# Patient Record
Sex: FEMALE | Race: WHITE | NOT HISPANIC OR LATINO | Employment: FULL TIME | URBAN - METROPOLITAN AREA
[De-identification: names, ages, dates, MRNs, and addresses within clinical notes are randomized per-mention and may not be internally consistent; named-entity substitution may affect disease eponyms.]

---

## 2017-09-20 ENCOUNTER — AMBULATORY - HEALTHEAST (OUTPATIENT)
Dept: NURSING | Facility: CLINIC | Age: 37
End: 2017-09-20

## 2017-09-20 DIAGNOSIS — Z23 FLU VACCINE NEED: ICD-10-CM

## 2017-10-16 ENCOUNTER — OFFICE VISIT - HEALTHEAST (OUTPATIENT)
Dept: FAMILY MEDICINE | Facility: CLINIC | Age: 37
End: 2017-10-16

## 2017-10-16 DIAGNOSIS — Z20.818 EXPOSURE TO STREP THROAT: ICD-10-CM

## 2017-10-16 ASSESSMENT — MIFFLIN-ST. JEOR: SCORE: 1548.79

## 2018-08-01 ENCOUNTER — OFFICE VISIT - HEALTHEAST (OUTPATIENT)
Dept: FAMILY MEDICINE | Facility: CLINIC | Age: 38
End: 2018-08-01

## 2018-08-01 DIAGNOSIS — Z82.2 FAMILY HISTORY OF HEARING LOSS: ICD-10-CM

## 2018-08-01 DIAGNOSIS — E55.9 VITAMIN D DEFICIENCY: ICD-10-CM

## 2018-08-01 DIAGNOSIS — Z00.00 ROUTINE GENERAL MEDICAL EXAMINATION AT A HEALTH CARE FACILITY: ICD-10-CM

## 2018-08-01 DIAGNOSIS — H91.90 DECREASED HEARING: ICD-10-CM

## 2018-08-01 LAB
CHOLEST SERPL-MCNC: 149 MG/DL
FASTING STATUS PATIENT QL REPORTED: YES
FASTING STATUS PATIENT QL REPORTED: YES
GLUCOSE BLD-MCNC: 78 MG/DL (ref 70–125)
HDLC SERPL-MCNC: 48 MG/DL
LDLC SERPL CALC-MCNC: 77 MG/DL
TRIGL SERPL-MCNC: 122 MG/DL

## 2018-08-01 ASSESSMENT — MIFFLIN-ST. JEOR: SCORE: 1580.54

## 2018-08-02 ENCOUNTER — COMMUNICATION - HEALTHEAST (OUTPATIENT)
Dept: FAMILY MEDICINE | Facility: CLINIC | Age: 38
End: 2018-08-02

## 2018-08-02 LAB — 25(OH)D3 SERPL-MCNC: 43.6 NG/ML (ref 30–80)

## 2018-09-25 ENCOUNTER — HOSPITAL ENCOUNTER (OUTPATIENT)
Dept: MAMMOGRAPHY | Facility: CLINIC | Age: 38
Discharge: HOME | End: 2018-09-25
Attending: FAMILY MEDICINE

## 2018-09-25 ENCOUNTER — COMMUNICATION - HEALTHEAST (OUTPATIENT)
Dept: FAMILY MEDICINE | Facility: CLINIC | Age: 38
End: 2018-09-25

## 2018-09-25 DIAGNOSIS — Z12.31 VISIT FOR SCREENING MAMMOGRAM: ICD-10-CM

## 2018-11-05 ENCOUNTER — COMMUNICATION - HEALTHEAST (OUTPATIENT)
Dept: FAMILY MEDICINE | Facility: CLINIC | Age: 38
End: 2018-11-05

## 2019-08-05 ENCOUNTER — OFFICE VISIT - HEALTHEAST (OUTPATIENT)
Dept: FAMILY MEDICINE | Facility: CLINIC | Age: 39
End: 2019-08-05

## 2019-08-05 ENCOUNTER — COMMUNICATION - HEALTHEAST (OUTPATIENT)
Dept: SCHEDULING | Facility: CLINIC | Age: 39
End: 2019-08-05

## 2019-08-05 ENCOUNTER — COMMUNICATION - HEALTHEAST (OUTPATIENT)
Dept: FAMILY MEDICINE | Facility: CLINIC | Age: 39
End: 2019-08-05

## 2019-08-05 DIAGNOSIS — S90.562A INSECT BITE OF LEFT ANKLE, INITIAL ENCOUNTER: ICD-10-CM
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
## 2019-10-09 ENCOUNTER — OFFICE VISIT - HEALTHEAST (OUTPATIENT)
Dept: FAMILY MEDICINE | Facility: CLINIC | Age: 39
End: 2019-10-09

## 2019-10-09 DIAGNOSIS — H91.93 DECREASED HEARING OF BOTH EARS: ICD-10-CM

## 2019-10-09 DIAGNOSIS — Z00.00 ROUTINE GENERAL MEDICAL EXAMINATION AT A HEALTH CARE FACILITY: ICD-10-CM

## 2019-10-09 DIAGNOSIS — E55.9 VITAMIN D DEFICIENCY: ICD-10-CM

## 2019-10-09 LAB
CHOLEST SERPL-MCNC: 165 MG/DL
FASTING STATUS PATIENT QL REPORTED: YES
FASTING STATUS PATIENT QL REPORTED: YES
GLUCOSE BLD-MCNC: 76 MG/DL (ref 70–125)
HDLC SERPL-MCNC: 56 MG/DL
LDLC SERPL CALC-MCNC: 92 MG/DL
TRIGL SERPL-MCNC: 87 MG/DL

## 2019-10-09 ASSESSMENT — MIFFLIN-ST. JEOR: SCORE: 1573.05

## 2019-10-10 LAB
25(OH)D3 SERPL-MCNC: 37.2 NG/ML (ref 30–80)
HPV SOURCE: NORMAL
HUMAN PAPILLOMA VIRUS 16 DNA: NEGATIVE
HUMAN PAPILLOMA VIRUS 18 DNA: NEGATIVE
HUMAN PAPILLOMA VIRUS FINAL DIAGNOSIS: NORMAL
HUMAN PAPILLOMA VIRUS OTHER HR: NEGATIVE
SPECIMEN DESCRIPTION: NORMAL

## 2019-10-16 ENCOUNTER — COMMUNICATION - HEALTHEAST (OUTPATIENT)
Dept: FAMILY MEDICINE | Facility: CLINIC | Age: 39
End: 2019-10-16

## 2019-10-28 ENCOUNTER — COMMUNICATION - HEALTHEAST (OUTPATIENT)
Dept: ADMINISTRATIVE | Facility: CLINIC | Age: 39
End: 2019-10-28

## 2019-10-30 ENCOUNTER — OFFICE VISIT - HEALTHEAST (OUTPATIENT)
Dept: AUDIOLOGY | Facility: CLINIC | Age: 39
End: 2019-10-30

## 2019-10-30 DIAGNOSIS — H69.92 DYSFUNCTION OF LEFT EUSTACHIAN TUBE: ICD-10-CM

## 2020-01-06 ENCOUNTER — HOSPITAL ENCOUNTER (OUTPATIENT)
Dept: MAMMOGRAPHY | Facility: CLINIC | Age: 40
Discharge: HOME | End: 2020-01-06
Attending: FAMILY MEDICINE

## 2020-01-06 DIAGNOSIS — Z00.00 ROUTINE GENERAL MEDICAL EXAMINATION AT A HEALTH CARE FACILITY: ICD-10-CM

## 2021-01-15 ENCOUNTER — OFFICE VISIT - HEALTHEAST (OUTPATIENT)
Dept: FAMILY MEDICINE | Facility: CLINIC | Age: 41
End: 2021-01-15

## 2021-01-15 DIAGNOSIS — Z00.00 ROUTINE GENERAL MEDICAL EXAMINATION AT A HEALTH CARE FACILITY: ICD-10-CM

## 2021-01-15 DIAGNOSIS — Z13.9 SCREENING FOR CONDITION: ICD-10-CM

## 2021-01-15 DIAGNOSIS — E55.9 VITAMIN D DEFICIENCY: ICD-10-CM

## 2021-01-15 LAB
CHOLEST SERPL-MCNC: 154 MG/DL
FASTING STATUS PATIENT QL REPORTED: YES
FASTING STATUS PATIENT QL REPORTED: YES
GLUCOSE BLD-MCNC: 70 MG/DL (ref 70–125)
HDLC SERPL-MCNC: 55 MG/DL
LDLC SERPL CALC-MCNC: 84 MG/DL
TRIGL SERPL-MCNC: 76 MG/DL

## 2021-01-15 ASSESSMENT — MIFFLIN-ST. JEOR: SCORE: 1523.61

## 2021-01-16 ENCOUNTER — COMMUNICATION - HEALTHEAST (OUTPATIENT)
Dept: FAMILY MEDICINE | Facility: CLINIC | Age: 41
End: 2021-01-16

## 2021-01-18 ENCOUNTER — COMMUNICATION - HEALTHEAST (OUTPATIENT)
Dept: FAMILY MEDICINE | Facility: CLINIC | Age: 41
End: 2021-01-18

## 2021-01-18 LAB — 25(OH)D3 SERPL-MCNC: 33.4 NG/ML (ref 30–80)

## 2021-05-31 ENCOUNTER — RECORDS - HEALTHEAST (OUTPATIENT)
Dept: ADMINISTRATIVE | Facility: CLINIC | Age: 41
End: 2021-05-31

## 2021-05-31 VITALS — WEIGHT: 176.5 LBS | HEIGHT: 71 IN | BODY MASS INDEX: 24.71 KG/M2

## 2021-05-31 NOTE — TELEPHONE ENCOUNTER
Triage call:   Bug bite on back of left leg- sore and itchy- since Saturday. Area is warm- more tender yesterday but still achy today. About the size of a nickel. Unsure what bit her.     Triaged to be seen in the clinic- reviewed additional care advice with patient and she verbalizes understanding. Patient warm transferred to scheduling for appointment. No appointments left in the clinic. Reviewed [facility] hours with her and she is agreeable to going in to be seen.     Ramya Moser RN BS [facility] Triage/Med Refill 8/5/2019 3:02 PM    Reason for Disposition    Red or very tender (to touch) area, and started over 24 hours after the bite    Protocols used: INSECT BITE-A-[place]

## 2021-05-31 NOTE — PROGRESS NOTES
Chief Complaint   Patient presents with     Insect Bite     SPIDER BITE 8/3/19, ITCHES, SORE, ABOVE LEFT ANKLE       HPI:  Jennie Alcantara is a 38 y.o. female who presents today complaining of spider bite that occurred 2 days ago. The bite is now sore and itchy.  The color of the bite has turned to a darker red.  She denies any fevers or chills or pus from the bite area.  She is not sure what bit her, and she first noticed it after being outside in the afternoon.    History obtained from the patient.    Problem List:  2015-07: Varicose veins  2015-07: Venous insufficiency  2014-12: Healthcare maintenance  Acute Viral Pharyngitis  Vitamin D Deficiency  Ankle Sprain  Normal vaginal delivery      Past Medical History:   Diagnosis Date     Healthcare maintenance 12/17/2014     Normal vaginal delivery      Normal vaginal delivery      Normal vaginal delivery      Varicose vein 12/17/2014     Varicose Veins     Created by Conversion      Vitamin D Deficiency     Created by Conversion        Social History     Tobacco Use     Smoking status: Never Smoker     Smokeless tobacco: Never Used   Substance Use Topics     Alcohol use: Yes     Comment: occasional       Review of Systems   Constitutional: Negative for chills and fever.   Skin: Positive for color change. Negative for rash.   All other systems reviewed and are negative.      Vitals:    08/05/19 1807   BP: 130/78   Pulse: 75   Resp: 16   Temp: 97.8  F (36.6  C)   TempSrc: Oral   SpO2: 99%   Weight: 185 lb (83.9 kg)       Physical Exam   Constitutional: She appears well-developed and well-nourished. No distress.   HENT:   Head: Normocephalic and atraumatic.   Right Ear: External ear normal.   Left Ear: External ear normal.   Eyes: Conjunctivae are normal. Right eye exhibits no discharge. Left eye exhibits no discharge.   Pulmonary/Chest: Effort normal. No respiratory distress.   Skin: She is not diaphoretic.   Insect bite noted on the left posterior ankle.  There is an area  of induration approximately 1.2 cm in diameter.  It is slightly tender to palpation.  There is no temperature change from the unaffected skin to the affected skin.  There is no fluctuance over the indurated area.   Psychiatric: She has a normal mood and affect. Her behavior is normal. Judgment and thought content normal.       Clinical Decision Making:  Insect bite of unknown etiology.  There is no scaling or necrosis indicative of venomous insect.  No abscess formation or warmth on the erythematous skin indicative of cellulitis.  Patient is reassured by this.  Recommend supportive cares including baking soda paste, Neosporin, and Benadryl cream.  At the end of the encounter, I discussed results, diagnosis, medications. Discussed red flags for immediate return to clinic/ER, as well as indications for follow up if no improvement. Patient understood and agreed to plan. Patient was stable for discharge.    1. Insect bite of left ankle, initial encounter           Patient Instructions   1.  Continue to apply bacitracin or Neosporin for infection prevention.  2.  For itch relief you can try baking soda paste or Benadryl cream.  3.  Continue to monitor symptoms, if you develop any pus, fever,  streaking redness, or warmth to the touch over the bite area I recommend following up.

## 2021-06-01 ENCOUNTER — RECORDS - HEALTHEAST (OUTPATIENT)
Dept: ADMINISTRATIVE | Facility: CLINIC | Age: 41
End: 2021-06-01

## 2021-06-01 VITALS — HEIGHT: 71 IN | WEIGHT: 183.5 LBS | BODY MASS INDEX: 25.69 KG/M2

## 2021-06-02 NOTE — PROGRESS NOTES
ASSESSMENT:  1. Routine general medical examination at a health care facility     - Gynecologic Cytology (PAP Smear)  - Lipid Cascade  - Glucose  - Vitamin D, Total (25-Hydroxy)  - Mammo Screening Bilateral; Future    2. Vitamin D deficiency       3. Decreased hearing of both ears     - Ambulatory referral to Audiology        PLAN:  There are no Patient Instructions on file for this visit.    Orders Placed This Encounter   Procedures     Mammo Screening Bilateral     Standing Status:   Future     Standing Expiration Date:   1/9/2021     Order Specific Question:   Is tomosynthesis (3D) requested?     Answer:   Yes     Order Specific Question:   Patient's previous breast density:     Answer:   Scattered fibroglandular density [2]     Order Specific Question:   Is the patient pregnant?     Answer:   No     Order Specific Question:   Can the procedure be changed per Radiologist protocol?     Answer:   Yes     Influenza,Seasonal,Quad,INJ =/>6months     Lipid Cascade     Order Specific Question:   Fasting is required?     Answer:   Yes     Glucose     Vitamin D, Total (25-Hydroxy)     Ambulatory referral to Audiology     Referral Priority:   Routine     Referral Type:   Audiology     Referral Reason:   Evaluation and Treatment     Requested Specialty:   Audiology     Number of Visits Requested:   1     Medications Discontinued During This Encounter   Medication Reason     levonorgestrel-ethinyl estradiol (ENPRESSE) 50-30 (6)/75-40 (5)/125-30(10) per tablet        No follow-ups on file.    Health Maintenance Due   Topic Date Due     HIV SCREENING  08/30/1993       CHIEF COMPLAINT:  Chief Complaint   Patient presents with     Annual Exam     needs referral for audiology, wasgiven previous referral but did not follow through and would like to now.     Gynecologic Exam       HISTORY OF PRESENT ILLNESS:  Jennie Alcantara is a 39 y.o. female presenting to the clinic today for a physical exam.     She thinks that she is having some  decreased hearing in both ears in multiple environments.  We had placed a referral for an audiology evaluation last year and she never followed through with it so she is requesting another referral be placed.    Her review of systems is negative for chest pains, breathing problems, bowel or bladder issues.  She sees the dentist regularly.    She had her first screening mammogram last year so we discussed doing another one to see if there is much change so that we can make an informed decision as to how often we think we want to try to do screening mammography.      Healthy Habits:   Patient reports regular exercise, dental and eye exams. Uses healthy diet. Always uses seatbelts. Reports uses medications as directed.  Alcohol: None  Smoking: None  Caffeine use: 1 cup a day  Drug use: None  Birth control:  had vasectomy    REVIEW OF SYSTEMS:   All other systems are negative.    Immunization History   Administered Date(s) Administered     Hep A, Adult IM (19yr & older) 12/15/2008     Hep A, historic 12/15/2008, 11/15/2010     Influenza [id number], 2009     Influenza, Seasonal, Inj PF IIV3 2009, 2012, 10/15/2012, 2013, 10/02/2015, 2016     Influenza, inj, historic,unspecified 10/01/2013, 2015, 2016     Influenza, seasonal,quad inj 6-35 mos 10/16/2014     Influenza,seasonal, Inj IIV3 2010, 10/16/2014     Influenza,seasonal,quad inj =/> 6months 2017, 10/09/2019     Td, Adult, Absorbed 2003     Tdap 12/15/2008, 2018       GYNECOLOGIC HISTORY:  Last menstrual period: 10/4/19  Contraception:  had vasectomy  Last Pap: 16 Results were: normal  Last mammogram: 18  Results were: abnormal    OB History        2    Para   2    Term   2            AB        Living   2       SAB        TAB        Ectopic        Multiple        Live Births               Obstetric Comments   Boy and girl             PFSH:     Social History     Tobacco  Use   Smoking Status Never Smoker   Smokeless Tobacco Never Used     Family History   Problem Relation Age of Onset     Cancer Mother      Diabetes Mother      Hearing loss Father      Vision loss Father      No Medical Problems Sister      No Medical Problems Brother      No Medical Problems Daughter      No Medical Problems Son      No Medical Problems Maternal Aunt      No Medical Problems Maternal Uncle      No Medical Problems Paternal Aunt      No Medical Problems Paternal Uncle      Cancer Maternal Grandmother      Stroke Maternal Grandfather      Hyperlipidemia Maternal Grandfather      Cancer Paternal Grandmother      Dementia Paternal Grandfather      No Medical Problems Maternal Uncle      No Medical Problems Paternal Uncle      Hypertension Paternal Uncle      Alcohol abuse Neg Hx      Arthritis Neg Hx      Asthma Neg Hx      Breast cancer Neg Hx      Clotting disorder Neg Hx      Colon cancer Neg Hx      COPD Neg Hx      Depression Neg Hx      Drug abuse Neg Hx      Early death Neg Hx      Heart disease Neg Hx      Kidney disease Neg Hx      Mental illness Neg Hx      Prostate cancer Neg Hx      Social History     Socioeconomic History     Marital status:      Spouse name: Dilip     Number of children: 2     Years of education: None     Highest education level: None   Occupational History     Occupation: cytopathologist     Employer: [employer]   Social Needs     Financial resource strain: None     Food insecurity:     Worry: None     Inability: None     Transportation needs:     Medical: None     Non-medical: None   Tobacco Use     Smoking status: Never Smoker     Smokeless tobacco: Never Used   Substance and Sexual Activity     Alcohol use: Yes     Comment: occasional     Drug use: No     Sexual activity: Yes     Partners: Male     Birth control/protection: Surgical   Lifestyle     Physical activity:     Days per week: None     Minutes per session: None     Stress: None   Relationships      "Social connections:     Talks on phone: None     Gets together: None     Attends Islam service: None     Active member of club or organization: None     Attends meetings of clubs or organizations: None     Relationship status: None     Intimate partner violence:     Fear of current or ex partner: None     Emotionally abused: None     Physically abused: None     Forced sexual activity: None   Other Topics Concern     None   Social History Narrative     with 2 kids     Past Surgical History:   Procedure Laterality Date     WISDOM TOOTH EXTRACTION  2000     No Known Allergies  Active Ambulatory Problems     Diagnosis Date Noted     Vitamin D Deficiency      Healthcare maintenance 12/17/2014     Varicose veins 07/22/2015     Venous insufficiency 07/22/2015     Resolved Ambulatory Problems     Diagnosis Date Noted     Acute Viral Pharyngitis      Ankle Sprain      Normal vaginal delivery      Past Medical History:   Diagnosis Date     Varicose vein 12/17/2014     Varicose Veins      Vitamin D Deficiency        VITALS:  Vitals:    10/09/19 0912   BP: 106/69   Patient Site: Right Arm   Patient Position: Sitting   Cuff Size: Adult Regular   Pulse: 73   SpO2: 97%   Weight: 183 lb 9.6 oz (83.3 kg)   Height: 5' 10\" (1.778 m)     BP Readings from Last 3 Encounters:   10/09/19 106/69   08/05/19 130/78   08/01/18 112/62     Wt Readings from Last 3 Encounters:   10/09/19 183 lb 9.6 oz (83.3 kg)   08/05/19 185 lb (83.9 kg)   08/01/18 183 lb 8 oz (83.2 kg)     Body mass index is 26.34 kg/m .    PHYSICAL EXAM:  General Appearance: Alert, cooperative, no distress, appears stated age  Head: Normocephalic, without obvious abnormality, atraumatic  Eyes: PERRL, conjunctiva/corneas clear, EOM's intact  Ears: Normal TM's and external ear canals, both ears  Nose: Nares normal, septum midline,mucosa normal, no drainage  Throat: Lips, mucosa, and tongue normal; teeth and gums normal  Neck: Supple, symmetrical, trachea midline, no " adenopathy;  thyroid: not enlarged, symmetric, no tenderness/mass/nodules   Back: Symmetric, no curvature, ROM normal, no CVA tenderness  Lungs: Clear to auscultation bilaterally, respirations unlabored  Breasts: No breast masses, tenderness, asymmetry, or nipple discharge.  Fibroglandular changes are noted heart: Regular rate and rhythm, S1 and S2 normal, no murmur, rub, or gallop, Abdomen: Soft, non-tender, bowel sounds active all four quadrants,  no masses, no organomegaly  Pelvic:Normally developed genitalia with no external lesions or eruptions. Vagina and cervix show no lesions, inflammation, discharge or tenderness. No cystocele, No rectocele. Uterus retroflexed.  No adnexal mass or tenderness.    Extremities: Extremities normal, atraumatic, no cyanosis or edema  Skin: Skin color, texture, turgor normal, no rashes or lesions  Lymph nodes: Cervical, supraclavicular, and axillary nodes normal  Neurologic: Normal      MEDICATIONS:  Current Outpatient Medications   Medication Sig Dispense Refill     multivitamin therapeutic (THERAGRAN) tablet Take 1 tablet by mouth daily.       OMEGA-3/DHA/EPA/FISH OIL (FISH OIL-OMEGA-3 FATTY ACIDS) 300-1,000 mg capsule Take 2 g by mouth daily.       cholecalciferol, vitamin D3, 2,000 unit Tab Take 1 tablet by mouth daily.       No current facility-administered medications for this visit.

## 2021-06-03 VITALS
HEIGHT: 70 IN | WEIGHT: 183.6 LBS | BODY MASS INDEX: 26.28 KG/M2 | DIASTOLIC BLOOD PRESSURE: 69 MMHG | SYSTOLIC BLOOD PRESSURE: 106 MMHG | HEART RATE: 73 BPM | OXYGEN SATURATION: 97 %

## 2021-06-03 VITALS — BODY MASS INDEX: 26.17 KG/M2 | WEIGHT: 185 LBS

## 2021-06-05 VITALS
SYSTOLIC BLOOD PRESSURE: 122 MMHG | DIASTOLIC BLOOD PRESSURE: 64 MMHG | WEIGHT: 172.7 LBS | HEART RATE: 62 BPM | HEIGHT: 70 IN | BODY MASS INDEX: 24.73 KG/M2

## 2021-06-13 NOTE — PROGRESS NOTES
Assessment:   1. Exposure to strep throat  Positive for strep.  Initiate treatment with antibiotic.  - Rapid Strep A Screen-Throat  - alum/mag hydrox-simethicone-diphenhydramine-lidocaine (MAGIC MOUTHWASH) suspension; Swish and spit 15 mL 4 (four) times a day.  Dispense: 240 mL; Refill: 0  - amoxicillin-clavulanate (AUGMENTIN) 875-125 mg per tablet; Take 1 tablet by mouth 2 (two) times a day for 10 days. Take 12 hours apart with food.  Dispense: 20 tablet; Refill: 0     Plan:   Patient placed on antibiotics.  Use of OTC analgesics recommended as well as salt water gargles.  Use of decongestant recommended.  Patient advised of the risk of peritonsillar abscess formation.  Patient advised that he will be infectious for 24 hours after starting antibiotics.  Follow up as needed.     Subjective:   Jennie Alcantara is a 37 y.o. female who presents for evaluation of sore throat. Associated symptoms include chills, headache, hoarseness, nasal blockage, green nasal discharge, post nasal drip, sinus and nasal congestion and sore throat. Onset of symptoms was 7 days ago, and have been gradually worsening since that time. She is not drinking much. She has had a recent close exposure to someone with proven streptococcal pharyngitis.    The following portions of the patient's history were reviewed and updated as appropriate:   She  has a past medical history of Healthcare maintenance (12/17/2014); Normal vaginal delivery; Normal vaginal delivery; Normal vaginal delivery; Varicose vein (12/17/2014); Varicose Veins; and Vitamin D Deficiency.  She  does not have any pertinent problems on file.  She  has a past surgical history that includes [place] tooth extraction (2000).  Her family history includes Cancer in her maternal grandmother, mother, and paternal grandmother; Dementia in her paternal grandfather; Diabetes in her mother; Hearing loss in her father; Hyperlipidemia in her maternal grandfather; Hypertension in her paternal uncle; No  "Medical Problems in her brother, daughter, maternal aunt, maternal uncle, maternal uncle, paternal aunt, paternal uncle, paternal uncle, sister, and son; Stroke in her maternal grandfather; Vision loss in her father. There is no history of Alcohol abuse, Arthritis, Asthma, Breast cancer, Clotting disorder, Colon cancer, COPD, Depression, Drug abuse, Early death, Heart disease, Kidney disease, Mental illness, or Prostate cancer.  She  reports that she has never smoked. She does not have any smokeless tobacco history on file. She reports that she drinks alcohol. She reports that she does not use illicit drugs.  Current Outpatient Prescriptions   Medication Sig Dispense Refill     cholecalciferol, vitamin D3, 2,000 unit Tab Take 1 tablet by mouth daily.       multivitamin therapeutic (THERAGRAN) tablet Take 1 tablet by mouth daily.       OMEGA-3/DHA/EPA/FISH OIL (FISH OIL-OMEGA-3 FATTY ACIDS) 300-1,000 mg capsule Take 2 g by mouth daily.       No current facility-administered medications for this visit.      Current Outpatient Prescriptions on File Prior to Visit   Medication Sig     cholecalciferol, vitamin D3, 2,000 unit Tab Take 1 tablet by mouth daily.     multivitamin therapeutic (THERAGRAN) tablet Take 1 tablet by mouth daily.     OMEGA-3/DHA/EPA/FISH OIL (FISH OIL-OMEGA-3 FATTY ACIDS) 300-1,000 mg capsule Take 2 g by mouth daily.     No current facility-administered medications on file prior to visit.      She has No Known Allergies..    Review of Systems  A 12 point comprehensive review of systems was negative except as noted.      Objective:      /58  Pulse 76  Temp 98.3  F (36.8  C) (Oral)   Ht 5' 10.5\" (1.791 m)  Wt 176 lb 8 oz (80.1 kg)  LMP 10/10/2017  SpO2 98%  BMI 24.97 kg/m2  General appearance: alert, appears stated age and cooperative  Head: Normocephalic, without obvious abnormality, atraumatic  Eyes: conjunctivae/corneas clear. PERRL, EOM's intact. Fundi benign.  Ears: normal TM's and " external ear canals both ears  Nose: Nares normal. Septum midline. Mucosa normal. No drainage or sinus tenderness.  Throat: abnormal findings: moderate oropharyngeal erythema  Neck: no adenopathy, no carotid bruit, no JVD, supple, symmetrical, trachea midline and thyroid not enlarged, symmetric, no tenderness/mass/nodules  Lungs: clear to auscultation bilaterally  Heart: regular rate and rhythm, S1, S2 normal, no murmur, click, rub or gallop  Abdomen: soft, non-tender; bowel sounds normal; no masses,  no organomegaly  Pulses: 2+ and symmetric  Skin: Skin color, texture, turgor normal. No rashes or lesions  Lymph nodes: Cervical, supraclavicular, and axillary nodes normal.  Neurologic: Grossly normal    Laboratory  Strep test done. Results:positive.

## 2021-06-14 NOTE — PROGRESS NOTES
ASSESSMENT:  1. Routine general medical examination at a health care facility       2. Screening for condition     - Mammo Screening Bilateral; Future  - Lipid Cascade  - Glucose  - Vitamin D, Total (25-Hydroxy)    3. Vitamin D deficiency              PLAN:  There are no Patient Instructions on file for this visit.    Orders Placed This Encounter   Procedures     Mammo Screening Bilateral     Standing Status:   Future     Standing Expiration Date:   4/15/2022     Order Specific Question:   Is tomosynthesis (3D) requested?     Answer:   Yes     Order Specific Question:   Patient's previous breast density:     Answer:   Scattered fibroglandular density [2]     Order Specific Question:   Is the patient pregnant?     Answer:   No     Order Specific Question:   Can the procedure be changed per Radiologist protocol?     Answer:   Yes     Lipid Cascade     Order Specific Question:   Fasting is required?     Answer:   Yes     Glucose     Vitamin D, Total (25-Hydroxy)     Medications Discontinued During This Encounter   Medication Reason     OMEGA-3/DHA/EPA/FISH OIL (FISH OIL-OMEGA-3 FATTY ACIDS) 300-1,000 mg capsule Therapy completed     cholecalciferol, vitamin D3, 2,000 unit Tab Therapy completed       No follow-ups on file.    Health Maintenance Due   Topic Date Due     HEPATITIS C SCREENING  1978     HIV SCREENING  08/30/1993       CHIEF COMPLAINT:  Chief Complaint   Patient presents with     Annual Exam       HISTORY OF PRESENT ILLNESS:  Jennie Alcantara is a 40 y.o. female presenting to the clinic today for a physical exam.     She has no particular concerns today.  She is fasting so we can do fasting lipids, glucose and a vitamin D.    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  She was furloughed for a few months this spring with the pandemic but now she is back to work full-time but has very flexible hours.    Healthy Habits  Are you taking a daily aspirin? No  Do you typically exercising at  least 40 min, 3-4 times per week?  Yes  Do you usually eat at least 4 servings of fruit and vegetables a day, include whole grains and fiber and avoid regularly eating high fat foods? Yes  Have you had an eye exam in the past two years? Yes  Do you see a dentist twice per year? Yes  Do you have any concerns regarding sleep? No  Do you drink caffeine? YES    Safety Screen  If you own firearms, are they secured in a locked gun cabinet or with trigger locks?    REVIEW OF SYSTEMS:   All other systems are negative.    Immunization History   Administered Date(s) Administered     Hep A, Adult IM (19yr & older) 12/15/2008     Hep A, historic 12/15/2008, 11/15/2010     Influenza Z3o1-89, 2009     Influenza, Seasonal, Inj PF IIV3 2009, 2012, 10/15/2012, 2013, 10/02/2015, 2016     Influenza, inj, historic,unspecified 10/01/2013, 2015, 2016     Influenza, seasonal,quad inj 6-35 mos 10/16/2014     Influenza,seasonal, Inj IIV3 2010, 10/16/2014     Influenza,seasonal,quad inj =/> 6months 2017, 10/09/2019     Td, Adult, Absorbed 2003     Tdap 12/15/2008, 2018       GYNECOLOGIC HISTORY:  Last menstrual period: 2021  Contraception:  has a vasectomy  Last Pap: 10/19 Results were: normal  Last mammogram:   Results were: normal    OB History        2    Para   2    Term   2            AB        Living   2       SAB        TAB        Ectopic        Multiple        Live Births               Obstetric Comments   Boy and girl             PFSH:     Social History     Tobacco Use   Smoking Status Never Smoker   Smokeless Tobacco Never Used     Family History   Problem Relation Age of Onset     Cancer Mother      Diabetes Mother      Hearing loss Father      Vision loss Father      No Medical Problems Sister      No Medical Problems Brother      No Medical Problems Daughter      No Medical Problems Son      No Medical Problems Maternal Aunt      No  Medical Problems Maternal Uncle      No Medical Problems Paternal Aunt      No Medical Problems Paternal Uncle      Cancer Maternal Grandmother      Stroke Maternal Grandfather      Hyperlipidemia Maternal Grandfather      Cancer Paternal Grandmother      Dementia Paternal Grandfather      No Medical Problems Maternal Uncle      No Medical Problems Paternal Uncle      Hypertension Paternal Uncle      Alcohol abuse Neg Hx      Arthritis Neg Hx      Asthma Neg Hx      Breast cancer Neg Hx      Clotting disorder Neg Hx      Colon cancer Neg Hx      COPD Neg Hx      Depression Neg Hx      Drug abuse Neg Hx      Early death Neg Hx      Heart disease Neg Hx      Kidney disease Neg Hx      Mental illness Neg Hx      Prostate cancer Neg Hx      Social History     Socioeconomic History     Marital status:      Spouse name: Dilip     Number of children: 2     Years of education: None     Highest education level: None   Occupational History     Occupation: cytopathologist     Employer: [employer]   Social Needs     Financial resource strain: None     Food insecurity     Worry: None     Inability: None     Transportation needs     Medical: None     Non-medical: None   Tobacco Use     Smoking status: Never Smoker     Smokeless tobacco: Never Used   Substance and Sexual Activity     Alcohol use: Yes     Comment: occasional     Drug use: No     Sexual activity: Yes     Partners: Male     Birth control/protection: Surgical   Lifestyle     Physical activity     Days per week: None     Minutes per session: None     Stress: None   Relationships     Social connections     Talks on phone: None     Gets together: None     Attends Hindu service: None     Active member of club or organization: None     Attends meetings of clubs or organizations: None     Relationship status: None     Intimate partner violence     Fear of current or ex partner: None     Emotionally abused: None     Physically abused: None     Forced sexual  "activity: None   Other Topics Concern     None   Social History Narrative     with 2 kids     Past Surgical History:   Procedure Laterality Date     WISDOM TOOTH EXTRACTION  2000     No Known Allergies  Active Ambulatory Problems     Diagnosis Date Noted     Vitamin D Deficiency      Healthcare maintenance 12/17/2014     Varicose veins 07/22/2015     Venous insufficiency 07/22/2015     Resolved Ambulatory Problems     Diagnosis Date Noted     Acute Viral Pharyngitis      Ankle Sprain      Normal vaginal delivery      Past Medical History:   Diagnosis Date     Varicose vein 12/17/2014     Varicose Veins      Vitamin D Deficiency        VITALS:  Vitals:    01/15/21 1239   BP: 122/64   Patient Site: Right Arm   Patient Position: Sitting   Cuff Size: Adult Regular   Pulse: 62   Weight: 172 lb 11.2 oz (78.3 kg)   Height: 5' 10\" (1.778 m)     BP Readings from Last 3 Encounters:   01/15/21 122/64   10/09/19 106/69   08/05/19 130/78     Wt Readings from Last 3 Encounters:   01/15/21 172 lb 11.2 oz (78.3 kg)   10/09/19 183 lb 9.6 oz (83.3 kg)   08/05/19 185 lb (83.9 kg)     Body mass index is 24.78 kg/m .    PHYSICAL EXAM:  General Appearance: Alert, cooperative, no distress, appears stated age  Head: Normocephalic, without obvious abnormality, atraumatic  Eyes: PERRL, conjunctiva/corneas clear, EOM's intact, glasses  Ears: Normal TM's and external ear canals, both ears  Nose: Nares normal, septum midline,mucosa normal, no drainage  Throat: Lips, mucosa, and tongue normal; teeth and gums normal  Neck: Supple, symmetrical, trachea midline, no adenopathy;  thyroid: not enlarged, symmetric, no tenderness/mass/nodules;    Back: Symmetric, no curvature, ROM normal, no CVA tenderness  Lungs: Clear to auscultation bilaterally, respirations unlabored  Breasts: No breast masses, tenderness, asymmetry, or nipple discharge.fibrocystic changes  Heart: Regular rate and rhythm, S1 and S2 normal, no murmur, rub, or gallop, Abdomen: " Soft, non-tender, bowel sounds active all four quadrants,  no masses, no organomegaly  Pelvic:Not examined  Extremities: Extremities normal, atraumatic, no cyanosis or edema  Skin: Skin color, texture, turgor normal, no rashes or lesions  Lymph nodes: Cervical, supraclavicular, and axillary nodes normal  Neurologic: Normal      MEDICATIONS:  Current Outpatient Medications   Medication Sig Dispense Refill     multivitamin therapeutic (THERAGRAN) tablet Take 1 tablet by mouth daily.       No current facility-administered medications for this visit.

## 2021-06-19 NOTE — PROGRESS NOTES
ASSESSMENT:  1. Routine general medical examination at a health care facility  Discussed mammography  - Lipid Cascade  - Glucose    2. Vitamin D deficiency     - Vitamin D, Total (25-Hydroxy)      Vaccine updated    PLAN:  There are no Patient Instructions on file for this visit.    Orders Placed This Encounter   Procedures     Mammo Screening Bilateral ACS     Standing Status:   Future     Standing Expiration Date:   8/1/2019     Order Specific Question:   Reason for Exam (Describe Symptoms):     Answer:   screening     Order Specific Question:   Is the patient pregnant?     Answer:   No     Order Specific Question:   Can the procedure be changed per Radiologist protocol?     Answer:   Yes     Tdap vaccine greater than or equal to 6yo IM     Lipid Cascade     Order Specific Question:   Fasting is required?     Answer:   Yes     Glucose     Vitamin D, Total (25-Hydroxy)     Ambulatory referral to Audiology     Referral Priority:   Routine     Referral Type:   Audiology     Referral Reason:   Evaluation and Treatment     Requested Specialty:   Audiology     Number of Visits Requested:   1           Health Maintenance Due   Topic Date Due     INFLUENZA VACCINE RULE BASED (1) 08/01/2018       CHIEF COMPLAINT:  Chief Complaint   Patient presents with     Annual Exam     fasting labs     Hearing Problem     slight hearing loss for the last 6 months       HISTORY OF PRESENT ILLNESS:  Jennie Alcantara is a 37 y.o. female presenting to the clinic today for a physical exam.     Healthy Habits:   Patient reports regular exercise, dental and eye exams. Uses healthy diet. Always uses seatbelts. Reports uses medications as directed.  Alcohol: occasional  Smoking: none  Caffeine use: 1-2 coffees per day  Drug use: none  Birth control: vasectomy; it was checked!    REVIEW OF SYSTEMS:   All other systems are negative.    Immunization History   Administered Date(s) Administered     Hep A, Adult IM (19yr & older) 12/15/2008     Hep A,  historic 12/15/2008, 11/15/2010     Influenza X9u3-77, 2009     Influenza, Seasonal, Inj PF IIV3 2009, 2012, 10/15/2012, 2013, 10/02/2015, 2016     Influenza, inj, historic,unspecified 10/01/2013, 2015, 2016     Influenza, seasonal,quad inj 36+ mos 2017     Influenza, seasonal,quad inj 6-35 mos 10/16/2014     Influenza,seasonal, Inj IIV3 2010, 10/16/2014     Td, Adult, Absorbed 2003     Tdap 12/15/2008, 2018       GYNECOLOGIC HISTORY:  Last menstrual period: 16  Contraception: vasectomy  Last Pap: 14 Results were: normal  Last mammogram: n/a  Results were: n/a    OB History      Para Term  AB Living    2 2    2    SAB TAB Ectopic Multiple Live Births                Obstetric Comments    Boy and girl          PFSH:     History   Smoking Status     Never Smoker   Smokeless Tobacco     Never Used     Family History   Problem Relation Age of Onset     Cancer Mother      Diabetes Mother      Hearing loss Father      Vision loss Father      No Medical Problems Sister      No Medical Problems Brother      No Medical Problems Daughter      No Medical Problems Son      No Medical Problems Maternal Aunt      No Medical Problems Maternal Uncle      No Medical Problems Paternal Aunt      No Medical Problems Paternal Uncle      Cancer Maternal Grandmother      Stroke Maternal Grandfather      Hyperlipidemia Maternal Grandfather      Cancer Paternal Grandmother      Dementia Paternal Grandfather      No Medical Problems Maternal Uncle      No Medical Problems Paternal Uncle      Hypertension Paternal Uncle      Alcohol abuse Neg Hx      Arthritis Neg Hx      Asthma Neg Hx      Breast cancer Neg Hx      Clotting disorder Neg Hx      Colon cancer Neg Hx      COPD Neg Hx      Depression Neg Hx      Drug abuse Neg Hx      Early death Neg Hx      Heart disease Neg Hx      Kidney disease Neg Hx      Mental illness Neg Hx      Prostate cancer Neg  "Hx      Social History     Social History     Marital status:      Spouse name: Dilip     Number of children: 2     Years of education: N/A     Occupational History     cytopathologist [employer]     Social History Main Topics     Smoking status: Never Smoker     Smokeless tobacco: Never Used     Alcohol use Yes      Comment: occasional     Drug use: No     Sexual activity: Yes     Partners: Male     Birth control/ protection: Surgical     Other Topics Concern     None     Social History Narrative     with 2 kids     Past Surgical History:   Procedure Laterality Date     WISDOM TOOTH EXTRACTION  2000     No Known Allergies  Active Ambulatory Problems     Diagnosis Date Noted     Vitamin D Deficiency      Healthcare maintenance 12/17/2014     Varicose veins 07/22/2015     Venous insufficiency 07/22/2015     Resolved Ambulatory Problems     Diagnosis Date Noted     Acute Viral Pharyngitis      Ankle Sprain      Normal vaginal delivery      Past Medical History:   Diagnosis Date     Healthcare maintenance 12/17/2014     Normal vaginal delivery      Normal vaginal delivery      Normal vaginal delivery      Varicose vein 12/17/2014     Varicose Veins      Vitamin D Deficiency        VITALS:  Vitals:    08/01/18 0921   BP: 112/62   Patient Site: Right Arm   Patient Position: Sitting   Cuff Size: Adult Regular   Pulse: 72   Weight: 183 lb 8 oz (83.2 kg)   Height: 5' 10.5\" (1.791 m)     BP Readings from Last 3 Encounters:   08/01/18 112/62   10/16/17 102/58   11/11/16 112/70     Wt Readings from Last 3 Encounters:   08/01/18 183 lb 8 oz (83.2 kg)   10/16/17 176 lb 8 oz (80.1 kg)   11/11/16 169 lb (76.7 kg)     Body mass index is 25.96 kg/(m^2).    PHYSICAL EXAM:  General Appearance: Alert, cooperative, no distress, appears stated age  Head: Normocephalic, without obvious abnormality, atraumatic  Eyes: PERRL, conjunctiva/corneas clear, EOM's intact  Ears: Normal TM's and external ear canals, both " ears  Nose: Nares normal, septum midline,mucosa normal, no drainage  Throat: Lips, mucosa, and tongue normal; teeth and gums normal  Neck: Supple, symmetrical, trachea midline, no adenopathy;  thyroid: not enlarged, symmetric, no tenderness/mass/nodules   Back: Symmetric, no curvature, ROM normal, no CVA tenderness  Lungs: Clear to auscultation bilaterally, respirations unlabored  Breasts: No breast masses, tenderness, asymmetry, or nipple discharge.  Heart: Regular rate and rhythm, S1 and S2 normal, no murmur, rub, or gallop, Abdomen: Soft, non-tender, bowel sounds active all four quadrants,  no masses, no organomegaly  Pelvic:Not examined  Extremities: Extremities normal, atraumatic, no cyanosis or edema  Skin: Skin color, texture, turgor normal, no rashes or lesions  Lymph nodes: Cervical, supraclavicular, and axillary nodes normal  Neurologic: Normal        MEDICATIONS:  Current Outpatient Prescriptions   Medication Sig Dispense Refill     cholecalciferol, vitamin D3, 2,000 unit Tab Take 1 tablet by mouth daily.       multivitamin therapeutic (THERAGRAN) tablet Take 1 tablet by mouth daily.       alum/mag hydrox-simethicone-diphenhydramine-lidocaine (MAGIC MOUTHWASH) suspension Swish and spit 15 mL 4 (four) times a day. (Patient not taking: Reported on 8/1/2018) 240 mL 0     OMEGA-3/DHA/EPA/FISH OIL (FISH OIL-OMEGA-3 FATTY ACIDS) 300-1,000 mg capsule Take 2 g by mouth daily.       No current facility-administered medications for this visit.

## 2021-06-19 NOTE — LETTER
Letter by Angelica Shipley MD at      Author: Angelica Shipley MD Service: -- Author Type: --    Filed:  Encounter Date: 10/28/2019 Status: Signed         Jennie Alcantara  [address] [place] [address]               October 28, 2019    Dear Jennie:    Our records indicate that you are due for a mammogram.    In the [place], one in nine women will develop breast cancer during their lifetime. While there is no way to prevent breast cancer, early detection provides the best opportunity for curing it.    For women over the age of 40, the American Cancer Society recommends a yearly clinical breast exam and a yearly mammogram. These practices have saved thousands of lives. We need your help to ensure that you are receiving optimal medical care.    Please make an appointment for a mammogram at your earliest convenience.    Sincerely,        Angelica Shipley MD

## 2021-06-26 ENCOUNTER — HEALTH MAINTENANCE LETTER (OUTPATIENT)
Age: 41
End: 2021-06-26

## 2021-06-28 NOTE — PROGRESS NOTES
"Progress Notes by Asuncion Owens AuD at 10/30/2019  8:30 AM     Author: Asuncion Owens AuD Service: -- Author Type: Audiologist    Filed: 10/30/2019  9:18 AM Encounter Date: 10/30/2019 Status: Signed    : Asuncion Owens AuD (Audiologist)       Audiology only; referred by Angelica Shipley    Summary:  Audiology visit completed. Please see audiogram below or under \"media\" tab for history and results.    Transducer:  Both insert phones and circumaural headphones were used.    Reliability:    Good    Recommendations:  Follow-up with PCP; retest hearing per medical management or patient concern.  Wear hearing protection consistently in noise to preserve current hearing sensitivity. Appropriate communication strategies were discussed at length, as were reasonable expectations regarding hearing at a distance, in noisy environments, or when attention is diverted elsewhere.    Harjinder Lujan, [facility]  [place] Licensed Audiologist [id number]           "

## 2021-10-16 ENCOUNTER — HEALTH MAINTENANCE LETTER (OUTPATIENT)
Age: 41
End: 2021-10-16

## 2021-12-02 DIAGNOSIS — Z20.822 EXPOSURE TO 2019 NOVEL CORONAVIRUS: Primary | ICD-10-CM

## 2021-12-06 ENCOUNTER — LAB (OUTPATIENT)
Dept: LAB | Facility: CLINIC | Age: 41
End: 2021-12-06
Attending: FAMILY MEDICINE
Payer: OTHER GOVERNMENT

## 2021-12-06 DIAGNOSIS — Z20.822 EXPOSURE TO 2019 NOVEL CORONAVIRUS: ICD-10-CM

## 2021-12-06 PROCEDURE — U0005 INFEC AGEN DETEC AMPLI PROBE: HCPCS

## 2021-12-06 PROCEDURE — U0003 INFECTIOUS AGENT DETECTION BY NUCLEIC ACID (DNA OR RNA); SEVERE ACUTE RESPIRATORY SYNDROME CORONAVIRUS 2 (SARS-COV-2) (CORONAVIRUS DISEASE [COVID-19]), AMPLIFIED PROBE TECHNIQUE, MAKING USE OF HIGH THROUGHPUT TECHNOLOGIES AS DESCRIBED BY CMS-2020-01-R: HCPCS

## 2021-12-07 LAB — SARS-COV-2 RNA RESP QL NAA+PROBE: NEGATIVE

## 2022-04-02 ENCOUNTER — HEALTH MAINTENANCE LETTER (OUTPATIENT)
Age: 42
End: 2022-04-02

## 2022-08-11 NOTE — PATIENT INSTRUCTIONS - HE
1.  Continue to apply bacitracin or Neosporin for infection prevention.  2.  For itch relief you can try baking soda paste or Benadryl cream.  3.  Continue to monitor symptoms, if you develop any pus, fever,  streaking redness, or warmth to the touch over the bite area I recommend following up.   CRYOSURGERY      Your doctor has used a method called cryosurgery to treat your skin condition. Cryosurgery refers to the use of very cold substances to treat a variety of skin conditions such as warts, pre-skin cancers, molluscum contagiosum, sun spots, and several benign growths. The substance we use in cryosurgery is liquid nitrogen and is so cold (-195 degrees Celsius) that is burns when administered.     Following treatment in the office, the skin may immediately burn and become red. You may find the area around the lesion is affected as well. It is sometimes necessary to treat not only the lesion, but a small area of the surrounding normal skin to achieve a good response.     A blister, and even a blood filled blister, may form after treatment.   This is a normal response. If the blister is painful, it is acceptable to sterilize a needle and with rubbing alcohol and gently pop the blister. It is important that you gently wash the area with soap and warm water as the blister fluid may contain wart virus if a wart was treated. Do no remove the roof of the blister.     The area treated can take anywhere from 1-3 weeks to heal. Healing time depends on the kind skin lesion treated, the location, and how aggressively the lesion was treated. It is recommended that the areas treated are covered with Vaseline or bacitracin ointment and a band-aid. If a band-aid is not practical, just ointment applied several times per day will do. Keeping these areas moist will speed the healing time.    Treatment with liquid nitrogen can leave a scar. In dark skin, it may be a light or dark scar, in light skin it may be a white or pink scar. These will generally fade with time, but may never go away completely.     If you have any concerns after your treatment, please feel free to call the office.       [address] [place] [address]/ [phone] FAX/ [email or web address]

## 2022-09-13 ENCOUNTER — OFFICE VISIT (OUTPATIENT)
Dept: FAMILY MEDICINE | Facility: CLINIC | Age: 42
End: 2022-09-13
Payer: OTHER GOVERNMENT

## 2022-09-13 VITALS
OXYGEN SATURATION: 98 % | WEIGHT: 186 LBS | HEIGHT: 70 IN | DIASTOLIC BLOOD PRESSURE: 60 MMHG | HEART RATE: 71 BPM | SYSTOLIC BLOOD PRESSURE: 110 MMHG | BODY MASS INDEX: 26.63 KG/M2

## 2022-09-13 DIAGNOSIS — Z76.89 ENCOUNTER TO ESTABLISH CARE: Primary | ICD-10-CM

## 2022-09-13 DIAGNOSIS — Z12.31 VISIT FOR SCREENING MAMMOGRAM: ICD-10-CM

## 2022-09-13 PROCEDURE — 90686 IIV4 VACC NO PRSV 0.5 ML IM: CPT | Performed by: NURSE PRACTITIONER

## 2022-09-13 PROCEDURE — 90471 IMMUNIZATION ADMIN: CPT | Performed by: NURSE PRACTITIONER

## 2022-09-13 PROCEDURE — 99212 OFFICE O/P EST SF 10 MIN: CPT | Mod: 25 | Performed by: NURSE PRACTITIONER

## 2022-09-13 NOTE — PROGRESS NOTES
"  Assessment & Plan     Encounter to establish care    Visit for screening mammogram  - *MA Screening Digital Bilateral         BMI:   Estimated body mass index is 26.5 kg/m  as calculated from the following:    Height as of this encounter: 1.784 m (5' 10.25\").    Weight as of this encounter: 84.4 kg (186 lb).       Return in about 1 year (around 9/13/2023) for Routine preventive, with me, in person.    Grace Godinez NP  [facility]    Sam Schneider is a 42 year old who presents to establish care.  Patient is  with 2 children, ages 12 and 10.  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    Denies any chronic medical conditions.  Not on any regular medication.    No concerns today.    Review of Systems   Pertinent items in HPI      Objective    /60 (BP Location: Right arm, Patient Position: Sitting, Cuff Size: Adult Regular)   Pulse 71   Ht 1.784 m (5' 10.25\")   Wt 84.4 kg (186 lb)   LMP 08/20/2022   SpO2 98%   BMI 26.50 kg/m    Body mass index is 26.5 kg/m .  Physical Exam   GENERAL: healthy, alert and no distress        "

## 2022-10-10 ENCOUNTER — HOSPITAL ENCOUNTER (OUTPATIENT)
Dept: MAMMOGRAPHY | Facility: CLINIC | Age: 42
Discharge: HOME | End: 2022-10-10
Attending: NURSE PRACTITIONER | Admitting: NURSE PRACTITIONER
Payer: OTHER GOVERNMENT

## 2022-10-10 ENCOUNTER — MYC MEDICAL ADVICE (OUTPATIENT)
Dept: FAMILY MEDICINE | Facility: CLINIC | Age: 42
End: 2022-10-10

## 2022-10-10 DIAGNOSIS — Z01.84 IMMUNITY STATUS TESTING: Primary | ICD-10-CM

## 2022-10-10 DIAGNOSIS — Z12.31 VISIT FOR SCREENING MAMMOGRAM: ICD-10-CM

## 2022-10-10 PROCEDURE — 77067 SCR MAMMO BI INCL CAD: CPT

## 2022-10-19 ENCOUNTER — LAB (OUTPATIENT)
Dept: LAB | Facility: CLINIC | Age: 42
End: 2022-10-19
Payer: OTHER GOVERNMENT

## 2022-10-19 DIAGNOSIS — Z01.84 IMMUNITY STATUS TESTING: ICD-10-CM

## 2022-10-19 PROCEDURE — 36415 COLL VENOUS BLD VENIPUNCTURE: CPT

## 2022-10-19 PROCEDURE — 86706 HEP B SURFACE ANTIBODY: CPT

## 2022-10-20 LAB
HBV SURFACE AB SERPL IA-ACNC: 13.13 M[IU]/ML
HBV SURFACE AB SERPL IA-ACNC: REACTIVE M[IU]/ML

## 2023-05-13 ENCOUNTER — HEALTH MAINTENANCE LETTER (OUTPATIENT)
Age: 43
End: 2023-05-13

## 2023-07-03 ENCOUNTER — OFFICE VISIT (OUTPATIENT)
Dept: FAMILY MEDICINE | Facility: CLINIC | Age: 43
End: 2023-07-03
Payer: OTHER GOVERNMENT

## 2023-07-03 VITALS
RESPIRATION RATE: 16 BRPM | SYSTOLIC BLOOD PRESSURE: 122 MMHG | DIASTOLIC BLOOD PRESSURE: 86 MMHG | HEART RATE: 71 BPM | TEMPERATURE: 98 F | OXYGEN SATURATION: 98 %

## 2023-07-03 DIAGNOSIS — S00.452A FOREIGN BODY OF LEFT EXTERNAL EAR: Primary | ICD-10-CM

## 2023-07-03 PROCEDURE — 69200 CLEAR OUTER EAR CANAL: CPT | Mod: LT | Performed by: STUDENT IN AN ORGANIZED HEALTH CARE EDUCATION/TRAINING PROGRAM

## 2023-07-03 NOTE — PROGRESS NOTES
Assessment & Plan     Foreign body of left external ear  Rubber earbud tip removed with forceps simple and uncomplicated, without pain, ENT exam otherwise unremarkable after foreign body removal.    Return if symptoms worsen or fail to improve.    Cliff Cody MD  [facility]    Sam Schneider is a 42 year old, presenting for the following health issues:  Ear Problem (Piece of ear bud stuck in left ear)        9/13/2022     3:34 PM   Additional Questions   Roomed by Levi X     Patient Active Problem List   Diagnosis     Vitamin D Deficiency     Varicose veins     Venous insufficiency     Current Outpatient Medications   Medication     multivitamin therapeutic (THERAGRAN) tablet     No current facility-administered medications for this visit.       HPI     Otherwise healthy patient, had a [place] is a earbud stuck in her left ear.    Just happened earlier today, not painful, no drainage.    Review of Systems   Constitutional, HEENT, cardiovascular, pulmonary, gi and gu systems are negative, except as otherwise noted.      Objective    /86   Pulse 71   Temp 98  F (36.7  C) (Oral)   Resp 16   SpO2 98%   There is no height or weight on file to calculate BMI.  Physical Exam   GENERAL: healthy, alert and no distress  EYES: Eyes grossly normal to inspection, PERRL and conjunctivae and sclerae normal  HENT: ear canals and TM's normal, nose and mouth without ulcers or lesions  MS: no gross musculoskeletal defects noted, no edema  SKIN: no suspicious lesions or rashes  NEURO: Normal strength and tone, mentation intact and speech normal  PSYCH: mentation appears normal, affect normal/bright

## 2023-11-21 ENCOUNTER — HOSPITAL ENCOUNTER (EMERGENCY)
Facility: CLINIC | Age: 43
Discharge: HOME | End: 2023-11-21
Attending: EMERGENCY MEDICINE | Admitting: EMERGENCY MEDICINE
Payer: OTHER GOVERNMENT

## 2023-11-21 ENCOUNTER — NURSE TRIAGE (OUTPATIENT)
Dept: FAMILY MEDICINE | Facility: CLINIC | Age: 43
End: 2023-11-21
Payer: OTHER GOVERNMENT

## 2023-11-21 VITALS
HEIGHT: 71 IN | HEART RATE: 80 BPM | TEMPERATURE: 98.5 F | RESPIRATION RATE: 20 BRPM | BODY MASS INDEX: 25.2 KG/M2 | SYSTOLIC BLOOD PRESSURE: 126 MMHG | WEIGHT: 180 LBS | OXYGEN SATURATION: 99 % | DIASTOLIC BLOOD PRESSURE: 72 MMHG

## 2023-11-21 DIAGNOSIS — R42 VERTIGO: ICD-10-CM

## 2023-11-21 LAB
FLUAV RNA SPEC QL NAA+PROBE: NEGATIVE
FLUBV RNA RESP QL NAA+PROBE: NEGATIVE
RSV RNA SPEC NAA+PROBE: NEGATIVE
SARS-COV-2 RNA RESP QL NAA+PROBE: NEGATIVE

## 2023-11-21 PROCEDURE — 250N000011 HC RX IP 250 OP 636: Performed by: EMERGENCY MEDICINE

## 2023-11-21 PROCEDURE — 87637 SARSCOV2&INF A&B&RSV AMP PRB: CPT | Performed by: EMERGENCY MEDICINE

## 2023-11-21 PROCEDURE — 99284 EMERGENCY DEPT VISIT MOD MDM: CPT

## 2023-11-21 PROCEDURE — 250N000013 HC RX MED GY IP 250 OP 250 PS 637: Performed by: EMERGENCY MEDICINE

## 2023-11-21 RX ORDER — METHYLPREDNISOLONE 4 MG
TABLET, DOSE PACK ORAL
Qty: 21 TABLET | Refills: 0 | Status: SHIPPED | OUTPATIENT
Start: 2023-11-21 | End: 2024-04-02

## 2023-11-21 RX ORDER — MECLIZINE HYDROCHLORIDE 25 MG/1
25 TABLET ORAL ONCE
Status: COMPLETED | OUTPATIENT
Start: 2023-11-21 | End: 2023-11-21

## 2023-11-21 RX ORDER — ONDANSETRON 4 MG/1
4 TABLET, ORALLY DISINTEGRATING ORAL
Status: COMPLETED | OUTPATIENT
Start: 2023-11-21 | End: 2023-11-21

## 2023-11-21 RX ORDER — MECLIZINE HYDROCHLORIDE 25 MG/1
25 TABLET ORAL 3 TIMES DAILY PRN
Qty: 20 TABLET | Refills: 0 | Status: SHIPPED | OUTPATIENT
Start: 2023-11-21

## 2023-11-21 RX ORDER — ONDANSETRON 4 MG/1
4 TABLET, ORALLY DISINTEGRATING ORAL EVERY 6 HOURS PRN
Qty: 20 TABLET | Refills: 0 | Status: SHIPPED | OUTPATIENT
Start: 2023-11-21

## 2023-11-21 RX ADMIN — MECLIZINE HYDROCHLORIDE 25 MG: 25 TABLET ORAL at 15:25

## 2023-11-21 RX ADMIN — ONDANSETRON 4 MG: 4 TABLET, ORALLY DISINTEGRATING ORAL at 15:26

## 2023-11-21 ASSESSMENT — ACTIVITIES OF DAILY LIVING (ADL): ADLS_ACUITY_SCORE: 33

## 2023-11-21 NOTE — ED TRIAGE NOTES
Arrives to ED from clinic with c/o L ear fullness, L ear ringing, dizziness and vomiting. L ear fullness began Sunday. Tried to go to  yesterday, they were no longer seeing pts. Felt better earlier today. Went to work. At noon began to experience ringing to L ear. Denies drainage from ear. Reports continued dizziness. Emesis x3.     Triage Assessment (Adult)       Row Name 11/21/23 4263          Triage Assessment    Airway WDL WDL        Respiratory WDL    Respiratory WDL WDL        Skin Circulation/Temperature WDL    Skin Circulation/Temperature WDL WDL        Cardiac WDL    Cardiac WDL WDL        Peripheral/Neurovascular WDL    Peripheral Neurovascular WDL WDL        Cognitive/Neuro/Behavioral WDL    Cognitive/Neuro/Behavioral WDL WDL

## 2023-11-21 NOTE — DISCHARGE INSTRUCTIONS
You are seen in the emergency department for vertigo and left ear fullness.  Your evaluation seems consistent with a peripheral vertigo, likely something like a vestibular neuritis/labyrinthitis.  We are going to prescribe you Zofran and meclizine which helped with symptom management here.  We are hopeful that your symptoms may improve over the next several days.  We are also going to prescribe you a steroid Dosepak, please take this per package instructions.  Would recommend follow-up with ENT/audiology.  We placed a referral for [facility] and attached some contact information for a clinic you can follow-up with.  If you have any other immediate concerns like severe headache, focal weakness, inability to walk, etc. we can reevaluate at any time in the emergency department.

## 2023-11-21 NOTE — ED PROVIDER NOTES
EMERGENCY DEPARTMENT ENCOUNTER      NAME: Jnenie Alcantara  AGE: 43 year old female  YOB: 1980  MRN: [id number]  EVALUATION DATE & TIME: No admission date for patient encounter.    PCP: Grace Godinez    ED PROVIDER: Chandrakant Mars M.D.      Chief Complaint   Patient presents with    Ear Fullness         FINAL IMPRESSION:  1. Vertigo          ED COURSE & MEDICAL DECISION MAKIN year old female presents to the Emergency Department for evaluation of left ear fullness and vertigo symptoms.  Patient has positional vertigo and left ear symptoms which started earlier in the day today.  She is vitally stable and arrives to emergency department.  Neurological exam is nonfocal.  She has normal tympanic membranes on exam with no evidence of significant effusion, erythema, bulging, or tympanic membrane rupture.  Symptoms seem consistent with a peripheral vertigo with sudden onset of severe positional symptoms.  Patient is young, otherwise healthy and should be very low risk for any acute intracranial process like stroke, aneurysm, tumor, etc.  We discussed but at this point we will hold off on neuroimaging like MRI.  Patient received some meclizine and Zofran was resting much more comfortably on recheck.  She was subsequently able to get up out of the chair and ambulate without difficulty.  Discussed that symptoms seem most consistent with likely a vestibular neuritis or similar.  No evidence of acute otitis media.  We discussed based on available evidence, there may be some utility to a steroid taper which was prescribed for her.  Will also keep her on Zofran and meclizine for continued symptom management.  Discussed referral to ENT/audiology which was placed.  Return precautions reviewed.  Patient discharged in stable condition.    At the conclusion of the encounter I discussed the results of all of the tests and the disposition. The questions were answered. The patient or family acknowledged understanding  and was agreeable with the care plan.     3:18 PM I met with the patient to obtain patient history and performed a physical exam. Discussed plan for ED work up including potential diagnostic studies and interventions.  4:19 PM Reevaluated and updated patient with findings.  4:52 PM We discussed the plan for discharge and the patient is agreeable. Reviewed supportive cares, symptomatic treatment, outpatient follow up, and reasons to return to the Emergency Department. Patient to be discharged by ED RN.     Medical Decision Making    History:  Supplemental history from: Documented in chart, if applicable  External Record(s) reviewed: Documented in chart, if applicable.    Work Up:  Chart documentation includes differential considered and any EKGs or imaging independently interpreted by provider, where specified.  In additional to work up documented, I considered the following work up: Documented in chart, if applicable.    External consultation:  Discussion of management with another provider: Documented in chart, if applicable    Complicating factors:  Care impacted by chronic illness: N/A  Care affected by social determinants of health: Access to Medical Care    Disposition considerations: Discharge. I prescribed additional prescription strength medication(s) as charted. See documentation for any additional details.            MEDICATIONS GIVEN IN THE EMERGENCY:  Medications   ondansetron (ZOFRAN ODT) ODT tab 4 mg (4 mg Oral $Given 11/21/23 1526)   meclizine (ANTIVERT) tablet 25 mg (25 mg Oral $Given 11/21/23 1525)       NEW PRESCRIPTIONS STARTED AT TODAY'S ER VISIT  Discharge Medication List as of 11/21/2023  4:58 PM        START taking these medications    Details   meclizine (ANTIVERT) 25 MG tablet Take 1 tablet (25 mg) by mouth 3 times daily as needed for dizziness, Disp-20 tablet, R-0, E-Prescribe      methylPREDNISolone (MEDROL DOSEPAK) 4 MG tablet therapy pack Follow Package Directions, Disp-21 tablet, R-0,  "E-Prescribe      ondansetron (ZOFRAN ODT) 4 MG ODT tab Take 1 tablet (4 mg) by mouth every 6 hours as needed for nausea or vomiting, Disp-20 tablet, R-0, E-Prescribe                =================================================================    HPI    Patient information was obtained from: patient    Use of : N/A         Jennie Alcantara is a 43 year old female with no pertinent history who presents to this ED via self walk-in for evaluation of ear fullness.    Patient reports persistent left ear \"fullness\" since yesterday. She describes the feeling as \"plugged\" like there is water in her ear. She feels like her hearing is diminished and muffled. She also has intermittent ear ringing (none now). The ear is not painful. She also associates very bad dizziness with associating nausea and vomiting starting at 12 PM today at work. The dizziness is worse with head movement. She's never felt this before.     She otherwise denies associating cough or cold symptoms, headache, focal weakness, or numbness. She denies recent airplane ride or swimming. She denies history of ear problems. There were no other concerns/complaints at this time.    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    REVIEW OF SYSTEMS   All systems reviewed and negative except as noted in HPI.    PAST MEDICAL HISTORY:  Past Medical History:   Diagnosis Date    Asymptomatic varicose veins     Created by Conversion     Healthcare maintenance 12/17/2014    Normal vaginal delivery     Normal vaginal delivery     Normal vaginal delivery     Unspecified vitamin D deficiency     Created by Conversion     Varicose vein 12/17/2014       PAST SURGICAL HISTORY:  Past Surgical History:   Procedure Laterality Date    WISDOM TOOTH EXTRACTION  2000           CURRENT MEDICATIONS:    No current facility-administered medications for this encounter.     Current Outpatient Medications   Medication    meclizine (ANTIVERT) 25 MG tablet    methylPREDNISolone " "(MEDROL DOSEPAK) 4 MG tablet therapy pack    ondansetron (ZOFRAN ODT) 4 MG ODT tab    multivitamin therapeutic (THERAGRAN) tablet         ALLERGIES:  No Known Allergies    FAMILY HISTORY:  Family History   Problem Relation Age of Onset    Cancer Mother     Diabetes Mother     Hearing Loss Father     Vision Loss Father     No Known Problems Sister     No Known Problems Brother     No Known Problems Daughter     No Known Problems Son     No Known Problems Maternal Aunt     No Known Problems Maternal Uncle     No Known Problems Paternal Aunt     No Known Problems Paternal Uncle     Cancer Maternal Grandmother     Cerebrovascular Disease Maternal Grandfather     Hyperlipidemia Maternal Grandfather     Cancer Paternal Grandmother     Dementia Paternal Grandfather     No Known Problems Maternal Uncle     No Known Problems Paternal Uncle     Hypertension Paternal Uncle     Alcoholism No family hx of     Arthritis No family hx of     Asthma No family hx of     Breast Cancer No family hx of     Clotting Disorder No family hx of     Colon Cancer No family hx of     Chronic Obstructive Pulmonary Disease No family hx of     Depression No family hx of     Substance Abuse No family hx of     Early Death No family hx of     Heart Disease No family hx of     Kidney Disease No family hx of     Mental Illness No family hx of     Prostate Cancer No family hx of        SOCIAL HISTORY:   Social History     Socioeconomic History    Marital status:     Number of children: 2   Tobacco Use    Smoking status: Never    Smokeless tobacco: Never   Substance and Sexual Activity    Alcohol use: Yes     Comment: Alcoholic Drinks/day: occasional    Drug use: No    Sexual activity: Yes     Partners: Male     Birth control/protection: Surgical   Social History Narrative     with 2 kids       VITALS:  /72   Pulse 80   Temp 98.5  F (36.9  C) (Oral)   Resp 20   Ht 1.791 m (5' 10.5\")   Wt 81.6 kg (180 lb)   SpO2 99%   BMI 25.46 " kg/m      PHYSICAL EXAM    Constitutional: Well developed, Well nourished, NAD.  HENT: Normocephalic, Atraumatic. Neck Supple.  Eyes: EOMI, Conjunctiva normal.  Respiratory: Breathing comfortably on room air. Speaks full sentences easily. Lungs clear to ascultation.  Cardiovascular: Normal heart rate, Regular rhythm. No peripheral edema.  Abdomen: Soft  Musculoskeletal: Good range of motion in all major joints. No major deformities noted.  Integument: Warm, Dry.  Neurologic: Fully awake, alert, oriented.  Face is symmetric.  Speech is normal.  Cranial nerves II through XII intact.  Strength is 5 out of 5 throughout bilateral upper and lower extremities.  Sensation to light touch intact x4. Patient is ambulatory.  Psychiatric: Cooperative. Affect appropriate.     LAB:  All pertinent labs reviewed and interpreted.  Labs Ordered and Resulted from Time of ED Arrival to Time of ED Departure   INFLUENZA A/B, RSV, & SARS-COV2 PCR - Normal       Result Value    Influenza A PCR Negative      Influenza B PCR Negative      RSV PCR Negative      SARS CoV2 PCR Negative         EKG:    None    PROCEDURES:   None      I, Maria Luisa Reece, am serving as a scribe to document services personally performed by Dr. Chandrakant Mars, based on my observation and the provider's statements to me. I, Chandrakant Mars MD attest that Maria Luisa Reece is acting in a scribe capacity, has observed my performance of the services and has documented them in accordance with my direction.    Chandrakant Mars M.D.  Emergency Medicine  [facility] EMERGENCY ROOM  [address] [facility] [address] [facility] [id number] [phone]  Dept: [phone]       Chandrakant Mars MD  11/21/23 6075

## 2023-11-21 NOTE — ED NOTES
"Expected Patient Referral to ED  1:23 PM    Referring Clinic/Provider:  Olivia RN at [facility]    Reason for referral/Clinical facts:  Ear problem on Sunday, \"full feeling\", ringing, vertigo and can't walk on her own.    Recommendations provided:  Send to ED for further evaluation    Caller was informed that this institution does possess the capabilities and/or resources to provide for patient and should be transferred to our facility.    Discussed that if direct admit is sought and any hurdles are encountered, this ED would be happy to see the patient and evaluate.    Informed caller that recommendations provided are recommendations based only on the facts provided and that they responsible to accept or reject the advice, or to seek a formal in person consultation as needed and that this ED will see/treat patient should they arrive.      Riaz Camarena MD  [facility] EMERGENCY ROOM  [address] [id number]  [phone]       Riaz Camarena MD  11/21/23 1324    "

## 2023-11-21 NOTE — TELEPHONE ENCOUNTER
"S-(situation): ear problem    B-(background): left ear not painful \"feels like I am underwater\" Started 11/19. Pt states this started after being in a hotel with a pool, pt did not swim.Denies hx of this happening prior    A-(assessment): sudden ringing in ear  Threw up x2  Dizziness  Denies swimming, denies recent flying, denies congestion.  Room was spinning.  Cannot walk on own    R-(recommendations): ED/UCC or to office with PCP approval. Pt was on way to ED or UCC with  while writer was triaging. Pt in parking lot, writer advised to get a wheelchair as pt cannot walk unassisted. Pt and  decided to go to ED after disposition.     Reason for Disposition   Spinning or tilting sensation (vertigo) present now and one or more stroke risk factors (i.e., hypertension, diabetes mellitus, prior stroke/TIA, heart attack, age over 60) (Exception: Prior physician evaluation for this AND no different/worse than usual.)    Additional Information   Negative: Hearing loss is main symptom   Negative: Followed an ear injury   Negative: Taking aspirin and dosage sounds high (i.e., > 1500 mg/day)   Negative: SEVERE difficulty breathing (e.g., struggling for each breath, speaks in single words)   Negative: Shock suspected (e.g., cold/pale/clammy skin, too weak to stand, low BP, rapid pulse)   Negative: Difficult to awaken or acting confused (e.g., disoriented, slurred speech)   Negative: Fainted, and still feels dizzy afterwards   Negative: Overdose (accidental or intentional) of medications   Negative: New neurologic deficit that is present now: * Weakness of the face, arm, or leg on one side of the body * Numbness of the face, arm, or leg on one side of the body * Loss of speech or garbled speech   Negative: Heart beating < 50 beats per minute OR > 140 beats per minute   Negative: Sounds like a life-threatening emergency to the triager   Negative: Chest pain   Negative: SEVERE dizziness (e.g., unable to stand, " requires support to walk, feels like passing out now)   Negative: SEVERE headache or neck pain    Protocols used: Tinnitus-A-OH, Dizziness-A-OH

## 2023-11-22 ENCOUNTER — TELEPHONE (OUTPATIENT)
Dept: OTOLARYNGOLOGY | Facility: CLINIC | Age: 43
End: 2023-11-22
Payer: OTHER GOVERNMENT

## 2023-11-22 NOTE — TELEPHONE ENCOUNTER
1. Have you noticed any changes in hearing? Yes  2. Do you have ringing, buzzing, or other sounds in your ears or head, this is also referred to as Tinnitus? Sometimes: random  3. When and where was your last hearing test? [facility]- doesn't remember when   4. Do you feel lightheaded or foggy? No  5. Do you have a spinning sensation? Yes  6. Is there any specific position that can bring on dizziness? random  7. Does looking up cause dizziness? No  8. Does getting in and our of bed cause dizziness? No  9. Does turning over in bed increase or cause dizziness? No  10. Does bending over cause dizziness? Yes  11. Is there anything that you can do to prevent the dizziness? no  12. Has the dizziness gotten better with time? No  13. Have you seen Physical Therapy for dizziness? (Please indicate clinic and as much of the location as possible): No  14. Are you being referred to a specific physician? No  15. Have you been evaluated/treated for your dizziness at any other location?  (If yes,obtian as much clinic/provider/locaiton as possible) No. (If yes answer the following questions:)   Have you seen any ENT, Neurology, or other providers for these symptoms?             No   Have you had any balance or Audiology testing? No Have you had an MRI or CT scan of your head or neck? No    Would you like to receive your Release of Information by mail or e-mail?  e-mail

## 2023-11-24 NOTE — TELEPHONE ENCOUNTER
FUTURE VISIT INFORMATION      FUTURE VISIT INFORMATION:  Date: 2/23/2024  Time: 8:40 AM  [place] [facility] [place]FORMATION:  Referring provider:  Chandrakant Mars MD  Referring providers clinic:  [facility]  Reason for visit/diagnosis:  Vertigo- Referred by Chandrakant Mars MD     RECORDS REQUESTED FROM:       Clinic name Comments Records Status Imaging Status   [facility] 11/21/23 referral/ ER OV notes- Chandrakant Mars MD [facility] Aud[facility] [place] [facility] 10/30/19 OV with Asuncion Owens AuD  10/30/19 audiogram [facility]        November 24, 2023 10:32 AM - Forward to audiology to review -[place]yma

## 2023-11-28 NOTE — TELEPHONE ENCOUNTER
Requesting orders for hearing test, VNG and ECochG prior to patient's ENT appointment with Vidya Gonzales PA-C.      Chart Review: Patient is being referred to ENT from [facility] ED for vertigo. Per ED notes 11/21/23: Patient experiencing left ear fullness and vertigo symptoms (severe dizziness with nausea and vomiting earlier that day). Dizziness is worse with head movement. She also reported hearing is diminished on that side and intermittent ear ringing. Patient was prescribed prednisone, meclizine and Zofran.       Dizzy Intake questionnaire: Patient reports changes in hearing, spinning sensations, and tinnitus sometimes. Bending over can cause dizziness, but answered no to all other BPPV screening questions.       Lucia Segovia.  Licensed Audiologist  [place] # [id number]

## 2023-11-29 DIAGNOSIS — R42 DIZZINESS: Primary | ICD-10-CM

## 2023-12-24 ENCOUNTER — HEALTH MAINTENANCE LETTER (OUTPATIENT)
Age: 43
End: 2023-12-24

## 2024-01-17 ENCOUNTER — TELEPHONE (OUTPATIENT)
Dept: AUDIOLOGY | Facility: CLINIC | Age: 44
End: 2024-01-17
Payer: OTHER GOVERNMENT

## 2024-02-23 ENCOUNTER — PRE VISIT (OUTPATIENT)
Dept: OTOLARYNGOLOGY | Facility: CLINIC | Age: 44
End: 2024-02-23

## 2024-03-28 ENCOUNTER — NURSE TRIAGE (OUTPATIENT)
Dept: NURSING | Facility: CLINIC | Age: 44
End: 2024-03-28
Payer: OTHER GOVERNMENT

## 2024-03-28 ENCOUNTER — HOSPITAL ENCOUNTER (EMERGENCY)
Facility: CLINIC | Age: 44
Discharge: HOME | End: 2024-03-28
Attending: EMERGENCY MEDICINE | Admitting: EMERGENCY MEDICINE
Payer: OTHER GOVERNMENT

## 2024-03-28 ENCOUNTER — APPOINTMENT (OUTPATIENT)
Dept: RADIOLOGY | Facility: CLINIC | Age: 44
End: 2024-03-28
Attending: EMERGENCY MEDICINE
Payer: OTHER GOVERNMENT

## 2024-03-28 VITALS
OXYGEN SATURATION: 97 % | HEART RATE: 71 BPM | BODY MASS INDEX: 25.9 KG/M2 | WEIGHT: 185 LBS | TEMPERATURE: 97.7 F | HEIGHT: 71 IN | SYSTOLIC BLOOD PRESSURE: 135 MMHG | DIASTOLIC BLOOD PRESSURE: 60 MMHG | RESPIRATION RATE: 18 BRPM

## 2024-03-28 DIAGNOSIS — S82.61XA CLOSED DISPLACED FRACTURE OF LATERAL MALLEOLUS OF RIGHT FIBULA, INITIAL ENCOUNTER: ICD-10-CM

## 2024-03-28 PROCEDURE — 250N000013 HC RX MED GY IP 250 OP 250 PS 637: Performed by: EMERGENCY MEDICINE

## 2024-03-28 PROCEDURE — 27808 TREATMENT OF ANKLE FRACTURE: CPT | Mod: RT

## 2024-03-28 PROCEDURE — 99284 EMERGENCY DEPT VISIT MOD MDM: CPT | Mod: 25

## 2024-03-28 PROCEDURE — 73610 X-RAY EXAM OF ANKLE: CPT | Mod: RT

## 2024-03-28 RX ORDER — OXYCODONE HYDROCHLORIDE 5 MG/1
5 TABLET ORAL ONCE
Status: COMPLETED | OUTPATIENT
Start: 2024-03-28 | End: 2024-03-28

## 2024-03-28 RX ORDER — OXYCODONE HYDROCHLORIDE 5 MG/1
5 TABLET ORAL EVERY 6 HOURS PRN
Qty: 10 TABLET | Refills: 0 | Status: SHIPPED | OUTPATIENT
Start: 2024-03-28 | End: 2024-03-31

## 2024-03-28 RX ORDER — IBUPROFEN 600 MG/1
600 TABLET, FILM COATED ORAL ONCE
Status: COMPLETED | OUTPATIENT
Start: 2024-03-28 | End: 2024-03-28

## 2024-03-28 RX ADMIN — OXYCODONE 5 MG: 5 TABLET ORAL at 08:18

## 2024-03-28 RX ADMIN — IBUPROFEN 600 MG: 600 TABLET ORAL at 06:43

## 2024-03-28 ASSESSMENT — ENCOUNTER SYMPTOMS: NUMBNESS: 1

## 2024-03-28 ASSESSMENT — COLUMBIA-SUICIDE SEVERITY RATING SCALE - C-SSRS
6. HAVE YOU EVER DONE ANYTHING, STARTED TO DO ANYTHING, OR PREPARED TO DO ANYTHING TO END YOUR LIFE?: NO
1. IN THE PAST MONTH, HAVE YOU WISHED YOU WERE DEAD OR WISHED YOU COULD GO TO SLEEP AND NOT WAKE UP?: NO
2. HAVE YOU ACTUALLY HAD ANY THOUGHTS OF KILLING YOURSELF IN THE PAST MONTH?: NO

## 2024-03-28 ASSESSMENT — ACTIVITIES OF DAILY LIVING (ADL)
ADLS_ACUITY_SCORE: 35
ADLS_ACUITY_SCORE: 35

## 2024-03-28 NOTE — TELEPHONE ENCOUNTER
Best way to handle it. Fell down one step and twisted right ankle. Felt a pop when she landed on it. Son helped her up stairs. Took acetaminophen and it helped. Can't bear weight and it's swollen. She will have someone bring her to the ER.  Grace Aguillon RN  [facility]    Reason for Disposition   Can't stand (bear weight) or walk    Additional Information   Negative: Serious injury with multiple fractures (broken bones)   Negative: [1] Major bleeding (e.g., actively dripping or spurting) AND [2] can't be stopped   Negative: Amputation   Negative: Looks like a dislocated joint (very crooked or deformed)   Negative: Sounds like a life-threatening emergency to the triager   Negative: Wound looks infected   Negative: Caused by an animal bite   Negative: Caused by a human bite   Negative: Puncture wound of foot   Negative: Toe injury is main concern   Negative: Cast problems or questions   Negative: Bullet wound, stabbed by knife, or other serious penetrating wound   Negative: Skin is split open or gaping (or length > 1/2 inch or 12 mm)   Negative: [1] Bleeding AND [2] won't stop after 10 minutes of direct pressure (using correct technique)   Negative: [1] Dirt in the wound AND [2] not removed with 15 minutes of scrubbing    Protocols used: Ankle and Foot Injury-A-AH

## 2024-03-28 NOTE — Clinical Note
Jennie Alcantara was seen and treated in our emergency department on 3/28/2024.  She may return to work on 04/01/2024.       If you have any questions or concerns, please don't hesitate to call.      Bonita Reyes, DO

## 2024-03-28 NOTE — ED PROVIDER NOTES
EMERGENCY DEPARTMENT ENCOUNTER      NAME: Jennie Alcantara  AGE: 43 year old female  YOB: 1980  MRN: [id number]  EVALUATION DATE & TIME: 3/28/2024  6:30 AM    PCP: Grace Godinez    ED PROVIDER: Bonita Reyes DO      Chief Complaint   Patient presents with    Ankle Pain         FINAL IMPRESSION:  1. Closed displaced fracture of lateral malleolus of right fibula, initial encounter          ED COURSE & MEDICAL DECISION MAKIN-year-old female presented to the ED for evaluation of right ankle pain following injury last night when she was walking down some stairs.  The patient reported mild paresthesias to the right foot that have been improving.  She had no other significant complaints.  The patient was hemodynamically stable upon arrival to the ED.  She did not appear to be in any obvious distress or discomfort at the time for initial evaluation.  On exam the patient was noted to have mild swelling and tenderness palpation noted over the right lateral malleolus.  Patient was noted to have full range of motion with the right ankle.  No other neurovascular deficits were noted.      Following initial evaluation the patient was given ibuprofen.    X-rays of the right ankle show a mildly displaced oblique lateral malleolus fracture.  Mortise appears intact.  Additional imaging of the proximal tibia and fibula was not felt to be necessary since there was no associated pain on exam.    The patient was reevaluated and informed of the x-ray results.  She and her  were educated about fibular fractures and reassured.  The patient was placed into a walking boot and she was given crutches.  The patient was instructed not to weight-bear until she follows up with orthopedics next week.  The patient was sent home with a few tabs of oxycodone to take as needed for any further pain in addition to over-the-counter ibuprofen.  She was instructed to keep the foot elevated whenever possible.  The patient was instructed  to return back to ED sooner for any worsening pain or any other new or concerning symptoms.      Pertinent Labs & Imaging studies reviewed. (See chart for details)    6:37 AM I met with the patient to gather history and to perform my initial exam. We discussed plans for the ED course, including diagnostic testing and treatment.         At the conclusion of the encounter I discussed the results of all of the tests and the disposition. The questions were answered. The patient or family acknowledged understanding and was agreeable with the care plan.     Medical Decision Making    History:  Supplemental history from: Documented in chart  External Record(s) reviewed: Documented in chart    Work Up:  Chart documentation includes differential considered and any EKGs or imaging independently interpreted by provider, where specified.  In additional to work up documented, I considered the following work up: Documented in chart, if applicable.    External consultation:  Discussion of management with another provider: Documented in chart, if applicable    Complicating factors:  Care impacted by chronic illness: N/A  Care affected by social determinants of health: N/A    Disposition considerations: Discharge. I prescribed additional prescription strength medication(s) as charted. See documentation for any additional details.      PPE worn: n95 mask, goggles    MEDICATIONS GIVEN IN THE EMERGENCY:  Medications   ibuprofen (ADVIL/MOTRIN) tablet 600 mg (600 mg Oral $Given 3/28/24 0643)       NEW PRESCRIPTIONS STARTED AT TODAY'S ER VISIT  New Prescriptions    OXYCODONE (ROXICODONE) 5 MG TABLET    Take 1 tablet (5 mg) by mouth every 6 hours as needed for severe pain          =================================================================    HPI    Patient information was obtained from: Patient and     Use of : N/A         Jennie Alcantara is a 43 year old female with no pertinent history who presents to this ED via  "wheelchair for evaluation of right ankle pain.     The patient reports that around 9:00 PM yesterday, she missed the last step while going downstairs, causing her right ankle to roll underneath her, hyperextending her right foot. She endorses pain to the outside of her right ankle with weight bearing and movement of the foot, but notes that she took acetaminophen at home, which helps her pain at rest. Patient also endorses a \"smidge\" of numbness and tingling in the right ankle last night, which has improved this morning. Denies any other concerns at this time.       REVIEW OF SYSTEMS   Review of Systems   Musculoskeletal:         Positive for right ankle swelling and pain.   Neurological:  Positive for numbness.        Positive for tingling.   All other systems reviewed and are negative.      PAST MEDICAL HISTORY:  Past Medical History:   Diagnosis Date    Asymptomatic varicose veins     Created by Conversion     Healthcare maintenance 12/17/2014    Normal vaginal delivery     Normal vaginal delivery     Normal vaginal delivery     Unspecified vitamin D deficiency     Created by Conversion     Varicose vein 12/17/2014       PAST SURGICAL HISTORY:  Past Surgical History:   Procedure Laterality Date    WISDOM TOOTH EXTRACTION  2000           CURRENT MEDICATIONS:    oxyCODONE (ROXICODONE) 5 MG tablet  meclizine (ANTIVERT) 25 MG tablet  methylPREDNISolone (MEDROL DOSEPAK) 4 MG tablet therapy pack  multivitamin therapeutic (THERAGRAN) tablet  ondansetron (ZOFRAN ODT) 4 MG ODT tab        ALLERGIES:  No Known Allergies    FAMILY HISTORY:  Family History   Problem Relation Age of Onset    Cancer Mother     Diabetes Mother     Hearing Loss Father     Vision Loss Father     No Known Problems Sister     No Known Problems Brother     No Known Problems Daughter     No Known Problems Son     No Known Problems Maternal Aunt     No Known Problems Maternal Uncle     No Known Problems Paternal Aunt     No Known Problems Paternal " "Uncle     Cancer Maternal Grandmother     Cerebrovascular Disease Maternal Grandfather     Hyperlipidemia Maternal Grandfather     Cancer Paternal Grandmother     Dementia Paternal Grandfather     No Known Problems Maternal Uncle     No Known Problems Paternal Uncle     Hypertension Paternal Uncle     Alcoholism No family hx of     Arthritis No family hx of     Asthma No family hx of     Breast Cancer No family hx of     Clotting Disorder No family hx of     Colon Cancer No family hx of     Chronic Obstructive Pulmonary Disease No family hx of     Depression No family hx of     Substance Abuse No family hx of     Early Death No family hx of     Heart Disease No family hx of     Kidney Disease No family hx of     Mental Illness No family hx of     Prostate Cancer No family hx of        SOCIAL HISTORY:   Social History     Socioeconomic History    Marital status:      Spouse name: None    Number of children: 2    Years of education: None    Highest education level: None   Tobacco Use    Smoking status: Never    Smokeless tobacco: Never   Substance and Sexual Activity    Alcohol use: Yes     Comment: Alcoholic Drinks/day: occasional    Drug use: No    Sexual activity: Yes     Partners: Male     Birth control/protection: Surgical   Social History Narrative     with 2 kids       VITALS:  /66   Pulse 71   Temp 97.7  F (36.5  C) (Oral)   Resp 18   Ht 1.791 m (5' 10.5\")   Wt 83.9 kg (185 lb)   SpO2 100%   BMI 26.17 kg/m      PHYSICAL EXAM    General presentation: Alert, Vital signs reviewed. No apparent distress.   HENT: ENT inspection is normal. Oropharynx is moist and clear.   Eye: Pupils are equal and reactive to light. EOMI  Neck: The neck is supple.  Pulmonary: Currently in no acute respiratory distress.   Circulatory: Peripheral pulses are strong and equal in the bilateral lower extremities.   Neurologic: Alert, oriented to person, place, and time. No gross motor or sensory deficits noted in " the right lower extremity. Cranial nerves II through XII are grossly intact.  Musculoskeletal: Mild swelling and tenderness to palpation noted over the right lateral malleolus.  Full range of motion noted of the right ankle.  No tenderness palpation noted over the proximal tibia-fibula  Skin: Skin color is normal. No rash. Warm. Dry to touch.      LAB:  All pertinent labs reviewed and interpreted.  Results for orders placed or performed during the hospital encounter of 03/28/24   Ankle XR, G/E 3 views, right    Impression    IMPRESSION: Mildly displaced oblique lateral malleolus fracture extends to the level of the talar dome. Severe lateral malleolus ankle soft tissue swelling. Probable tiny avulsive fracture from the medial process talus. Intact-appearing ankle mortise and   distal syndesmosis. Tibiotalar and hindfoot joint spaces are normal.    NOTE: ABNORMAL REPORT    THE DICTATION ABOVE DESCRIBES AN ABNORMALITY FOR WHICH FOLLOW-UP IS NEEDED.                   RADIOLOGY:  Reviewed all pertinent imaging. Please see official radiology report.  Ankle XR, G/E 3 views, right   Preliminary Result   IMPRESSION: Mildly displaced oblique lateral malleolus fracture extends to the level of the talar dome. Severe lateral malleolus ankle soft tissue swelling. Probable tiny avulsive fracture from the medial process talus. Intact-appearing ankle mortise and    distal syndesmosis. Tibiotalar and hindfoot joint spaces are normal.      NOTE: ABNORMAL REPORT      THE DICTATION ABOVE DESCRIBES AN ABNORMALITY FOR WHICH FOLLOW-UP IS NEEDED.                            IKatlyn, am serving as a scribe to document services personally performed by Bonita Reyes DO based on my observation and the provider's statements to me. I, Bonita Reyes, attest that Katlyn Bello is acting in a scribe capacity, has observed my performance of the services and has documented them in accordance with my direction.    Bonita Reyes DO  Emergency  [facility] EMERGENCY ROOM  [address] [place] [address]  [phone]       Bonita Reyes DO  03/28/24 0731

## 2024-03-28 NOTE — ED TRIAGE NOTES
To ED per POV    C/o R ankle pain with weight bearing after missing a step and hyperextending her foot, significant swelling noted.     Pain relieved at rest by Tylenol (last dose at 0400)     Triage Assessment (Adult)       Row Name 03/28/24 0611          Triage Assessment    Airway WDL WDL        Respiratory WDL    Respiratory WDL WDL        Skin Circulation/Temperature WDL    Skin Circulation/Temperature WDL WDL        Cardiac WDL    Cardiac WDL WDL        Peripheral/Neurovascular WDL    Peripheral Neurovascular WDL WDL        Cognitive/Neuro/Behavioral WDL    Cognitive/Neuro/Behavioral WDL WDL

## 2024-03-28 NOTE — DISCHARGE INSTRUCTIONS
X-rays show that you have a mildly displaced fibular fracture.  Wear the walking boot and use the crutches as directed.  It is recommended that you avoid any weightbearing with the right leg until you can follow-up with orthopedics for reevaluation.  You can use over-the-counter ibuprofen as needed for any further pain.  Use the prescribed oxycodone as needed for any pain not controlled by the ibuprofen.  Return back to the ED sooner for any worsening pain or any other new or concerning symptoms.

## 2024-04-01 ENCOUNTER — TRANSFERRED RECORDS (OUTPATIENT)
Dept: HEALTH INFORMATION MANAGEMENT | Facility: CLINIC | Age: 44
End: 2024-04-01
Payer: OTHER GOVERNMENT

## 2024-04-02 ENCOUNTER — OFFICE VISIT (OUTPATIENT)
Dept: FAMILY MEDICINE | Facility: CLINIC | Age: 44
End: 2024-04-02
Payer: OTHER GOVERNMENT

## 2024-04-02 VITALS
HEIGHT: 70 IN | WEIGHT: 185 LBS | SYSTOLIC BLOOD PRESSURE: 125 MMHG | HEART RATE: 93 BPM | TEMPERATURE: 97.7 F | DIASTOLIC BLOOD PRESSURE: 82 MMHG | OXYGEN SATURATION: 99 % | BODY MASS INDEX: 26.48 KG/M2

## 2024-04-02 DIAGNOSIS — Z01.818 PREOP GENERAL PHYSICAL EXAM: Primary | ICD-10-CM

## 2024-04-02 DIAGNOSIS — Z01.818 PREOP GENERAL PHYSICAL EXAM: ICD-10-CM

## 2024-04-02 DIAGNOSIS — Z12.11 SCREEN FOR COLON CANCER: ICD-10-CM

## 2024-04-02 DIAGNOSIS — S82.831A OTHER CLOSED FRACTURE OF DISTAL END OF RIGHT FIBULA, INITIAL ENCOUNTER: ICD-10-CM

## 2024-04-02 LAB — HCG UR QL: NEGATIVE

## 2024-04-02 PROCEDURE — 99213 OFFICE O/P EST LOW 20 MIN: CPT | Performed by: NURSE PRACTITIONER

## 2024-04-02 PROCEDURE — 81025 URINE PREGNANCY TEST: CPT

## 2024-04-02 NOTE — PROGRESS NOTES
Preoperative Evaluation  [facility]  [address] [place] [address]  Phone: [phone]  Fax: [phone]  Primary Provider: Grace Godinez  Pre-op Performing Provider: GRACE GODINEZ  Apr 2, 2024       Jennie is a 43 year old, presenting for the following:  Pre-Op Exam (Surgery 4/5/24 at [facility] in [facility], for right ankle )      Surgical Information  Surgery/Procedure: ORIF right distal fibula, possible syndesmosis/deltoid repair  Surgery Location: [facility] in [place]   Surgeon: Phoenix Awan MD  Surgery Date: 4/5/2024  Time of Surgery: TBD  Where patient plans to recover: At home with family  Fax number for surgical facility: [phone]    Assessment & Plan     The proposed surgical procedure is considered INTERMEDIATE risk.    Preop general physical exam  Urine pregnancy negative.  - HCG qualitative urine    Other closed fracture of distal end of right fibula, initial encounter    Screen for colon cancer  - Colonoscopy Screening  Referral           - No identified additional risk factors other than previously addressed    Antiplatelet or Anticoagulation Medication Instructions   - Patient is on no antiplatelet or anticoagulation medications.    Additional Medication Instructions   - ibuprofen (Advil, Motrin): HOLD 1 day before surgery.     Recommendation  APPROVAL GIVEN to proceed with proposed procedure, without further diagnostic evaluation.        Subjective       HPI related to upcoming procedure:     Patient sustained a right ankle fracture on 3/27/2024 while descending some stairs.  Currently using Ibuprofen and Oxycodone for pain.         4/1/2024     9:53 AM   Preop Questions   1. Have you ever had a heart attack or stroke? No   2. Have you ever had surgery on your heart or blood vessels, such as a stent placement, a coronary artery bypass, or surgery on an artery in your head, neck, heart, or legs? No   3. Do you have chest  pain with activity? No   4. Do you have a history of  heart failure? No   5. Do you currently have a cold, bronchitis or symptoms of other infection? No   6. Do you have a cough, shortness of breath, or wheezing? No   7. Do you or anyone in your family have previous history of blood clots? No   8. Do you or does anyone in your family have a serious bleeding problem such as prolonged bleeding following surgeries or cuts? No   9. Have you ever had problems with anemia or been told to take iron pills? No   10. Have you had any abnormal blood loss such as black, tarry or bloody stools, or abnormal vaginal bleeding? No   11. Have you ever had a blood transfusion? No   12. Are you willing to have a blood transfusion if it is medically needed before, during, or after your surgery? Yes   13. Have you or any of your relatives ever had problems with anesthesia? No   14. Do you have sleep apnea, excessive snoring or daytime drowsiness? No   15. Do you have any artifical heart valves or other implanted medical devices like a pacemaker, defibrillator, or continuous glucose monitor? No   16. Do you have artificial joints? No   17. Are you allergic to latex? No   18. Is there any chance that you may be pregnant? No       Health Care Directive  Patient does not have a Health Care Directive or Living Will: Patient states has Advance Directive and will bring in a copy to clinic.    Preoperative Review of    reviewed - #10 Oxycodone prescribed 3/28/2024      Patient Active Problem List    Diagnosis Date Noted    Vitamin D Deficiency      Priority: Medium     Created by Conversion  Replacement Utility updated for latest IMO load        Varicose veins 07/22/2015     Priority: Medium    Venous insufficiency 07/22/2015     Priority: Medium      Past Medical History:   Diagnosis Date    Asymptomatic varicose veins     Created by Conversion     Healthcare maintenance 12/17/2014    Normal vaginal delivery     Normal vaginal delivery      Normal vaginal delivery     Unspecified vitamin D deficiency     Created by Conversion     Varicose vein 12/17/2014     Past Surgical History:   Procedure Laterality Date    WISDOM TOOTH EXTRACTION  2000     Current Outpatient Medications   Medication Sig Dispense Refill    meclizine (ANTIVERT) 25 MG tablet Take 1 tablet (25 mg) by mouth 3 times daily as needed for dizziness 20 tablet 0    multivitamin therapeutic (THERAGRAN) tablet [MULTIVITAMIN THERAPEUTIC (THERAGRAN) TABLET] Take 1 tablet by mouth daily.      ondansetron (ZOFRAN ODT) 4 MG ODT tab Take 1 tablet (4 mg) by mouth every 6 hours as needed for nausea or vomiting 20 tablet 0       No Known Allergies     Social History     Tobacco Use    Smoking status: Never    Smokeless tobacco: Never   Substance Use Topics    Alcohol use: Yes     Comment: Alcoholic Drinks/day: occasional     Family History   Problem Relation Age of Onset    Cancer Mother     Diabetes Mother     Hearing Loss Father     Vision Loss Father     No Known Problems Sister     No Known Problems Brother     No Known Problems Daughter     No Known Problems Son     No Known Problems Maternal Aunt     No Known Problems Maternal Uncle     No Known Problems Paternal Aunt     No Known Problems Paternal Uncle     Cancer Maternal Grandmother     Cerebrovascular Disease Maternal Grandfather     Hyperlipidemia Maternal Grandfather     Cancer Paternal Grandmother     Dementia Paternal Grandfather     No Known Problems Maternal Uncle     No Known Problems Paternal Uncle     Hypertension Paternal Uncle     Alcoholism No family hx of     Arthritis No family hx of     Asthma No family hx of     Breast Cancer No family hx of     Clotting Disorder No family hx of     Colon Cancer No family hx of     Chronic Obstructive Pulmonary Disease No family hx of     Depression No family hx of     Substance Abuse No family hx of     Early Death No family hx of     Heart Disease No family hx of     Kidney Disease No  "family hx of     Mental Illness No family hx of     Prostate Cancer No family hx of      History   Drug Use No         Review of Systems    Review of Systems  CONSTITUTIONAL: NEGATIVE for fever, chills, change in weight  INTEGUMENTARY/SKIN: NEGATIVE for worrisome rashes, moles or lesions  EYES: NEGATIVE for vision changes or irritation  ENT/MOUTH: NEGATIVE for ear, mouth and throat problems  RESP: NEGATIVE for significant cough or SOB  BREAST: NEGATIVE for masses, tenderness or discharge  CV: NEGATIVE for chest pain, palpitations or peripheral edema  GI: NEGATIVE for nausea, abdominal pain, heartburn, or change in bowel habits  : NEGATIVE for frequency, dysuria, or hematuria  MUSCULOSKELETAL: NEGATIVE for significant arthralgias or myalgia  NEURO: NEGATIVE for weakness, dizziness or paresthesias  ENDOCRINE: NEGATIVE for temperature intolerance, skin/hair changes  HEME: NEGATIVE for bleeding problems  PSYCHIATRIC: NEGATIVE for changes in mood or affect    Objective    /82 (BP Location: Right arm, Patient Position: Sitting, Cuff Size: Adult Regular)   Pulse 93   Temp 97.7  F (36.5  C) (Temporal)   Ht 1.778 m (5' 10\")   Wt 83.9 kg (185 lb)   SpO2 99%   BMI 26.54 kg/m     Estimated body mass index is 26.54 kg/m  as calculated from the following:    Height as of this encounter: 1.778 m (5' 10\").    Weight as of this encounter: 83.9 kg (185 lb).  Physical Exam  GENERAL: alert and no distress  EYES: Eyes grossly normal to inspection, PERRL and conjunctivae and sclerae normal  HENT: ear canals and TM's normal, nose and mouth without ulcers or lesions  NECK: no adenopathy, no asymmetry, masses, or scars  RESP: lungs clear to auscultation - no rales, rhonchi or wheezes  CV: regular rate and rhythm, normal S1 S2, no S3 or S4, no murmur, click or rub, no peripheral edema  ABDOMEN: soft, nontender, no hepatosplenomegaly, no masses and bowel sounds normal  MS: no gross musculoskeletal defects noted, no edema  SKIN: " "no suspicious lesions or rashes  NEURO: Normal strength and tone, mentation intact and speech normal  PSYCH: mentation appears normal, affect normal/bright    No results for input(s): \"HGB\", \"PLT\", \"INR\", \"NA\", \"POTASSIUM\", \"CR\", \"A1C\" in the last 42843 hours.     Diagnostics  Recent Results (from the past 24 hour(s))   HCG qualitative urine    Collection Time: 04/02/24  1:02 PM   Result Value Ref Range    hCG Urine Qualitative Negative Negative      No EKG required, no history of coronary heart disease, significant arrhythmia, peripheral arterial disease or other structural heart disease.    Revised Cardiac Risk Index (RCRI)  The patient has the following serious cardiovascular risks for perioperative complications:   - No serious cardiac risks = 0 points     RCRI Interpretation: 0 points: Class I (very low risk - 0.4% complication rate)         Signed Electronically by: Grace Godinez NP  Copy of this evaluation report is provided to requesting physician.         "

## 2024-04-02 NOTE — PATIENT INSTRUCTIONS
Preparing for Your Surgery  Getting started  A nurse will call you to review your health history and instructions. They will give you an arrival time based on your scheduled surgery time. Please be ready to share:  Your doctor's clinic name and phone number  Your medical, surgical, and anesthesia history  A list of allergies and sensitivities  A list of medicines, including herbal treatments and over-the-counter drugs  Whether the patient has a legal guardian (ask how to send us the papers in advance)  Please tell us if you're pregnant--or if there's any chance you might be pregnant. Some surgeries may injure a fetus (unborn baby), so they require a pregnancy test. Surgeries that are safe for a fetus don't always need a test, and you can choose whether to have one.   If you have a child who's having surgery, please ask for a copy of Preparing for Your Child's Surgery.    Preparing for surgery  Within 10 to 30 days of surgery: Have a pre-op exam (sometimes called an H&P, or History and Physical). This can be done at a clinic or pre-operative center.  If you're having a , you may not need this exam. Talk to your care team.  At your pre-op exam, talk to your care team about all medicines you take. If you need to stop any medicines before surgery, ask when to start taking them again.  We do this for your safety. Many medicines can make you bleed too much during surgery. Some change how well surgery (anesthesia) drugs work.  Call your insurance company to let them know you're having surgery. (If you don't have insurance, call [phone].)  Call your clinic if there's any change in your health. This includes signs of a cold or flu (sore throat, runny nose, cough, rash, fever). It also includes a scrape or scratch near the surgery site.  If you have questions on the day of surgery, call your hospital or surgery center.  Eating and drinking guidelines  For your safety: Unless your surgeon tells you otherwise,  follow the guidelines below.  Eat and drink as usual until 8 hours before you arrive for surgery. After that, no food or milk.  Drink clear liquids until 2 hours before you arrive. These are liquids you can see through, like water, Gatorade, and Propel Water. They also include plain black coffee and tea (no cream or milk), candy, and breath mints. You can spit out gum when you arrive.  If you drink alcohol: Stop drinking it the night before surgery.  If your care team tells you to take medicine on the morning of surgery, it's okay to take it with a sip of water.  Preventing infection  Shower or bathe the night before and morning of your surgery. Follow the instructions your clinic gave you. (If no instructions, use regular soap.)  Don't shave or clip hair near your surgery site. We'll remove the hair if needed.  Don't smoke or vape the morning of surgery. You may chew nicotine gum up to 2 hours before surgery. A nicotine patch is okay.  Note: Some surgeries require you to completely quit smoking and nicotine. Check with your surgeon.  Your care team will make every effort to keep you safe from infection. We will:  Clean our hands often with soap and water (or an alcohol-based hand rub).  Clean the skin at your surgery site with a special soap that kills germs.  Give you a special gown to keep you warm. (Cold raises the risk of infection.)  Wear special hair covers, masks, gowns and gloves during surgery.  Give antibiotic medicine, if prescribed. Not all surgeries need antibiotics.  What to bring on the day of surgery  Photo ID and insurance card  Copy of your health care directive, if you have one  Glasses and hearing aids (bring cases)  You can't wear contacts during surgery  Inhaler and eye drops, if you use them (tell us about these when you arrive)  CPAP machine or breathing device, if you use them  A few personal items, if spending the night  If you have . . .  A pacemaker, ICD (cardiac defibrillator) or other  implant: Bring the ID card.  An implanted stimulator: Bring the remote control.  A legal guardian: Bring a copy of the certified (court-stamped) guardianship papers.  Please remove any jewelry, including body piercings. Leave jewelry and other valuables at home.  If you're going home the day of surgery  You must have a responsible adult drive you home. They should stay with you overnight as well.  If you don't have someone to stay with you, and you aren't safe to go home alone, we may keep you overnight. Insurance often won't pay for this.  After surgery  If it's hard to control your pain or you need more pain medicine, please call your surgeon's office.  Questions?   If you have any questions for your care team, list them here: _________________________________________________________________________________________________________________________________________________________________________ ____________________________________ ____________________________________ ____________________________________  For informational purposes only. Not to replace the advice of your health care provider. Copyright   2003, 2019 [employer] All rights reserved. Clinically reviewed by Merced Cooper MD. [employer] 687752 - REV 12/22.    How to Take Your Medication Before Surgery  Antiplatelet or Anticoagulation Medication Instructions   - Patient is on no antiplatelet or anticoagulation medications.    Additional Medication Instructions   - ibuprofen (Advil, Motrin): HOLD 1 day before surgery.

## 2024-04-04 ENCOUNTER — DOCUMENTATION ONLY (OUTPATIENT)
Dept: EMERGENCY MEDICINE | Facility: CLINIC | Age: 44
End: 2024-04-04
Payer: OTHER GOVERNMENT

## 2024-04-04 DIAGNOSIS — S82.61XA CLOSED DISPLACED FRACTURE OF LATERAL MALLEOLUS OF RIGHT FIBULA, INITIAL ENCOUNTER: Primary | ICD-10-CM

## 2024-04-05 ENCOUNTER — TRANSFERRED RECORDS (OUTPATIENT)
Dept: HEALTH INFORMATION MANAGEMENT | Facility: CLINIC | Age: 44
End: 2024-04-05
Payer: OTHER GOVERNMENT

## 2024-04-11 ENCOUNTER — DOCUMENTATION ONLY (OUTPATIENT)
Dept: EMERGENCY MEDICINE | Facility: CLINIC | Age: 44
End: 2024-04-11
Payer: OTHER GOVERNMENT

## 2024-04-11 DIAGNOSIS — S82.61XA CLOSED DISPLACED FRACTURE OF LATERAL MALLEOLUS OF RIGHT FIBULA, INITIAL ENCOUNTER: Primary | ICD-10-CM

## 2024-04-18 ENCOUNTER — TRANSFERRED RECORDS (OUTPATIENT)
Dept: HEALTH INFORMATION MANAGEMENT | Facility: CLINIC | Age: 44
End: 2024-04-18
Payer: OTHER GOVERNMENT

## 2024-05-09 ENCOUNTER — TRANSFERRED RECORDS (OUTPATIENT)
Dept: HEALTH INFORMATION MANAGEMENT | Facility: CLINIC | Age: 44
End: 2024-05-09
Payer: OTHER GOVERNMENT

## 2024-05-28 ENCOUNTER — TRANSFERRED RECORDS (OUTPATIENT)
Dept: HEALTH INFORMATION MANAGEMENT | Facility: CLINIC | Age: 44
End: 2024-05-28
Payer: OTHER GOVERNMENT

## 2024-06-27 ENCOUNTER — TRANSFERRED RECORDS (OUTPATIENT)
Dept: HEALTH INFORMATION MANAGEMENT | Facility: CLINIC | Age: 44
End: 2024-06-27
Payer: OTHER GOVERNMENT

## 2024-07-20 ENCOUNTER — HEALTH MAINTENANCE LETTER (OUTPATIENT)
Age: 44
End: 2024-07-20

## 2024-09-26 ENCOUNTER — TRANSFERRED RECORDS (OUTPATIENT)
Dept: HEALTH INFORMATION MANAGEMENT | Facility: CLINIC | Age: 44
End: 2024-09-26
Payer: OTHER GOVERNMENT

## 2024-10-30 ENCOUNTER — HOSPITAL ENCOUNTER (OUTPATIENT)
Dept: MAMMOGRAPHY | Facility: CLINIC | Age: 44
Discharge: HOME | End: 2024-10-30
Attending: NURSE PRACTITIONER | Admitting: NURSE PRACTITIONER
Payer: OTHER GOVERNMENT

## 2024-10-30 DIAGNOSIS — Z12.31 VISIT FOR SCREENING MAMMOGRAM: ICD-10-CM

## 2024-10-30 PROCEDURE — 77063 BREAST TOMOSYNTHESIS BI: CPT

## 2025-08-09 ENCOUNTER — HEALTH MAINTENANCE LETTER (OUTPATIENT)
Age: 45
End: 2025-08-09